# Patient Record
Sex: MALE | Race: WHITE | Employment: FULL TIME | URBAN - METROPOLITAN AREA
[De-identification: names, ages, dates, MRNs, and addresses within clinical notes are randomized per-mention and may not be internally consistent; named-entity substitution may affect disease eponyms.]

---

## 2017-02-07 ENCOUNTER — OFFICE VISIT (OUTPATIENT)
Dept: ORTHOPEDIC SURGERY | Age: 57
End: 2017-02-07

## 2017-02-07 VITALS — WEIGHT: 216.93 LBS | BODY MASS INDEX: 29.38 KG/M2 | HEIGHT: 72 IN

## 2017-02-07 DIAGNOSIS — S43.421S SPRAIN OF RIGHT ROTATOR CUFF CAPSULE, SEQUELA: ICD-10-CM

## 2017-02-07 DIAGNOSIS — M75.101 TEAR OF RIGHT ROTATOR CUFF, UNSPECIFIED TEAR EXTENT: Primary | ICD-10-CM

## 2017-02-07 PROCEDURE — 99213 OFFICE O/P EST LOW 20 MIN: CPT | Performed by: ORTHOPAEDIC SURGERY

## 2017-03-15 ENCOUNTER — TELEPHONE (OUTPATIENT)
Dept: ORTHOPEDIC SURGERY | Age: 57
End: 2017-03-15

## 2017-06-02 PROBLEM — K57.20 DIVERTICULITIS OF COLON WITH PERFORATION: Status: ACTIVE | Noted: 2017-06-02

## 2018-07-02 ENCOUNTER — OFFICE VISIT (OUTPATIENT)
Dept: ORTHOPEDIC SURGERY | Age: 58
End: 2018-07-02

## 2018-07-02 VITALS — HEIGHT: 72 IN | BODY MASS INDEX: 25.73 KG/M2 | WEIGHT: 190 LBS

## 2018-07-02 DIAGNOSIS — M25.562 LEFT KNEE PAIN, UNSPECIFIED CHRONICITY: Primary | ICD-10-CM

## 2018-07-02 DIAGNOSIS — M17.12 PRIMARY OSTEOARTHRITIS OF LEFT KNEE: ICD-10-CM

## 2018-07-02 PROCEDURE — 3017F COLORECTAL CA SCREEN DOC REV: CPT | Performed by: ORTHOPAEDIC SURGERY

## 2018-07-02 PROCEDURE — 99213 OFFICE O/P EST LOW 20 MIN: CPT | Performed by: ORTHOPAEDIC SURGERY

## 2018-07-02 PROCEDURE — L1830 KO IMMOB CANVAS LONG PRE OTS: HCPCS | Performed by: ORTHOPAEDIC SURGERY

## 2018-07-02 PROCEDURE — 4004F PT TOBACCO SCREEN RCVD TLK: CPT | Performed by: ORTHOPAEDIC SURGERY

## 2018-07-02 PROCEDURE — G8419 CALC BMI OUT NRM PARAM NOF/U: HCPCS | Performed by: ORTHOPAEDIC SURGERY

## 2018-07-02 PROCEDURE — G8427 DOCREV CUR MEDS BY ELIG CLIN: HCPCS | Performed by: ORTHOPAEDIC SURGERY

## 2018-07-02 RX ORDER — HYDROCODONE BITARTRATE AND ACETAMINOPHEN 5; 325 MG/1; MG/1
1 TABLET ORAL EVERY 6 HOURS PRN
Qty: 20 TABLET | Refills: 0 | Status: SHIPPED | OUTPATIENT
Start: 2018-07-02 | End: 2018-07-11 | Stop reason: SDUPTHER

## 2018-07-02 NOTE — PROGRESS NOTES
 Neck pain 2010    Other symptoms referable to back       Past Surgical History:     Past Surgical History:   Procedure Laterality Date    BACK SURGERY  2012    lumbar fusion    CHOLECYSTECTOMY      FOOT SURGERY  2010    KNEE ARTHROSCOPY Left 07/05/2016    also  I &D.  KNEE SURGERY  age 22   [de-identified] CUFF REPAIR Right 2014    SHOULDER SURGERY Right 2015     Current Medications:     Current Outpatient Prescriptions:     LORazepam (ATIVAN) 1 MG tablet, Take 1 mg by mouth 2 times daily. , Disp: , Rfl:     Atorvastatin Calcium (LIPITOR PO), Take 40 mg by mouth every morning , Disp: , Rfl:     Tamsulosin HCl (FLOMAX PO), Take 0.4 mg by mouth nightly , Disp: , Rfl:     Mirtazapine (REMERON PO), Take 30 mg by mouth nightly , Disp: , Rfl:   Allergies:  Patient has no known allergies. Social History:    reports that he has been smoking Cigarettes. He has a 10.00 pack-year smoking history. He has never used smokeless tobacco. He reports that he does not drink alcohol or use drugs. Family History:   Family History   Problem Relation Age of Onset    Other Mother         crohns    Heart Disease Father     High Blood Pressure Father        REVIEW OF SYSTEMS:   For new problems, a full review of systems will be found scanned in the patient's chart. CONSTITUTIONAL: Denies unexplained weight loss, fevers, chills   NEUROLOGICAL: Denies unsteady gait or progressive weakness  SKIN: Denies skin changes, delayed healing, rash, itching       PHYSICAL EXAM:    Vitals: Height 6' (1.829 m), weight 190 lb (86.2 kg). GENERAL EXAM:  · General Apparence: Patient is adequately groomed with no evidence of malnutrition. · Orientation: The patient is oriented to time, place and person. · Mood & Affect:The patient's mood and affect are appropriate       LEFT knee PHYSICAL EXAMINATION:  · Inspection:  Upon inspection the patient is a varus deformity of the LEFT knee with an effusion.   Well-healed surgical incisions complications that would include a stroke, cardiopulmonary pathology, and even death. We also discussed the rehabilitation involved with this operation and options that involved not only the hospitalization but then the potential of either going home with visiting home therapy versus going to a rehabilitation facility. We talked about the nuances of each of these treatments. The patient also realizes the need for the hinged knee brace in the rehabilitation process as well as the very significant role that the patient plays in terms of rehabilitation after this type of operation. All questions were answered. I have personally performed and/or participated in the history, exam and medical decision making and agree with all pertinent clinical information. I have also reviewed and agree with the past medical, family and social history unless otherwise noted. This dictation was performed with a verbal recognition program (DRAGON) and it was checked for errors. It is possible that there are still dictated errors within this office note. If so, please bring any errors to my attention for an addendum. All efforts were made to ensure that this office note is accurate.           Shamika Albright MD

## 2018-07-10 ENCOUNTER — TELEPHONE (OUTPATIENT)
Dept: ORTHOPEDIC SURGERY | Age: 58
End: 2018-07-10

## 2018-07-11 ENCOUNTER — HOSPITAL ENCOUNTER (OUTPATIENT)
Dept: OTHER | Age: 58
Discharge: HOME | End: 2018-07-11
Attending: ORTHOPAEDIC SURGERY | Admitting: ORTHOPAEDIC SURGERY

## 2018-07-11 ENCOUNTER — TELEPHONE (OUTPATIENT)
Dept: ORTHOPEDIC SURGERY | Age: 58
End: 2018-07-11

## 2018-07-11 DIAGNOSIS — M25.562 LEFT KNEE PAIN, UNSPECIFIED CHRONICITY: ICD-10-CM

## 2018-07-11 DIAGNOSIS — M17.12 PRIMARY OSTEOARTHRITIS OF LEFT KNEE: ICD-10-CM

## 2018-07-11 LAB
ABO/RH: NORMAL
ALBUMIN SERPL-MCNC: 3.9 G/DL (ref 3.4–5)
ANTIBODY SCREEN: NORMAL
APTT: 33.6 SEC (ref 26–36)
BASOPHILS ABSOLUTE: 0 K/UL (ref 0–0.2)
BASOPHILS RELATIVE PERCENT: 0.4 %
BILIRUBIN URINE: NEGATIVE
BLOOD, URINE: NEGATIVE
CLARITY: CLEAR
COLOR: YELLOW
EOSINOPHILS ABSOLUTE: 0.2 K/UL (ref 0–0.6)
EOSINOPHILS RELATIVE PERCENT: 1.8 %
GLUCOSE URINE: NEGATIVE MG/DL
HCT VFR BLD CALC: 46.3 % (ref 40.5–52.5)
HEMOGLOBIN: 16.2 G/DL (ref 13.5–17.5)
INR BLD: 0.95 (ref 0.86–1.14)
KETONES, URINE: NEGATIVE MG/DL
LEUKOCYTE ESTERASE, URINE: NEGATIVE
LYMPHOCYTES ABSOLUTE: 1.9 K/UL (ref 1–5.1)
LYMPHOCYTES RELATIVE PERCENT: 20 %
MCH RBC QN AUTO: 32.4 PG (ref 26–34)
MCHC RBC AUTO-ENTMCNC: 35 G/DL (ref 31–36)
MCV RBC AUTO: 92.4 FL (ref 80–100)
MICROSCOPIC EXAMINATION: NORMAL
MONOCYTES ABSOLUTE: 1.2 K/UL (ref 0–1.3)
MONOCYTES RELATIVE PERCENT: 12.5 %
NEUTROPHILS ABSOLUTE: 6.1 K/UL (ref 1.7–7.7)
NEUTROPHILS RELATIVE PERCENT: 65.3 %
NITRITE, URINE: NEGATIVE
PDW BLD-RTO: 13.6 % (ref 12.4–15.4)
PH UA: 7.5
PLATELET # BLD: 232 K/UL (ref 135–450)
PMV BLD AUTO: 8.1 FL (ref 5–10.5)
PROTEIN UA: NEGATIVE MG/DL
PROTHROMBIN TIME: 10.8 SEC (ref 9.8–13)
RBC # BLD: 5.01 M/UL (ref 4.2–5.9)
REASON FOR REJECTION: NORMAL
REJECTED TEST: NORMAL
SPECIFIC GRAVITY UA: 1.01
TRANSFERRIN: 222 MG/DL (ref 200–360)
URINE TYPE: NORMAL
UROBILINOGEN, URINE: 0.2 E.U./DL
WBC # BLD: 9.4 K/UL (ref 4–11)

## 2018-07-11 RX ORDER — HYDROCODONE BITARTRATE AND ACETAMINOPHEN 5; 325 MG/1; MG/1
1 TABLET ORAL EVERY 6 HOURS PRN
Qty: 20 TABLET | Refills: 0 | Status: SHIPPED | OUTPATIENT
Start: 2018-07-11 | End: 2018-07-16

## 2018-07-11 RX ORDER — OXYCODONE HYDROCHLORIDE AND ACETAMINOPHEN 5; 325 MG/1; MG/1
1 TABLET ORAL EVERY 6 HOURS PRN
Qty: 28 TABLET | Refills: 0 | Status: CANCELLED | OUTPATIENT
Start: 2018-07-11 | End: 2018-07-18

## 2018-07-11 NOTE — TELEPHONE ENCOUNTER
Last week this patient was given a very small quantity of pain medication to take before upcoming total joint replacement. I will give him 1 additional small quantity of pain medication but he cannot have any more refills until his postoperative prescription.   He will need to make this medication last.

## 2018-07-12 LAB
ESTIMATED AVERAGE GLUCOSE: 114 MG/DL
HBA1C MFR BLD: 5.6 %
URINE CULTURE, ROUTINE: NORMAL

## 2018-07-20 NOTE — PROGRESS NOTES
Obstructive Sleep Apnea (MARY) Screening     Patient:  Main Shirley    YOB: 1960      Medical Record #:  [id number]                     Date:  7/20/2018     1. Are you a loud and/or regular snorer? []  Yes       [x] No    2. Have you been observed to gasp or stop breathing during sleep? []  Yes       [x] No    3. Do you feel tired or groggy upon awakening or do you awaken with a headache?           []  Yes       [x] No    4. Are you often tired or fatigued during the wake time hours? []  Yes       [x] No    5. Do you fall asleep sitting, reading, watching TV or driving? []  Yes       [x] No    6. Do you often have problems with memory or concentration? []  Yes       [x] No    **If patient's score is ? 3 they are considered high risk for MARY. Notify the anesthesiologist of the high risk and document in focus note. Note:  If the patient's BMI is more than 35 kg m¯² , has neck circumference > 40 cm, and/or high blood pressure the risk is greater (© American Sleep Apnea Association, 2006).

## 2018-07-25 ENCOUNTER — ANESTHESIA EVENT (OUTPATIENT)
Dept: OPERATING ROOM | Age: 58
DRG: 470 | End: 2018-07-25
Payer: COMMERCIAL

## 2018-07-25 RX ORDER — PREGABALIN 75 MG/1
75 CAPSULE ORAL 2 TIMES DAILY
Status: CANCELLED | OUTPATIENT
Start: 2018-07-25

## 2018-07-25 RX ORDER — CELECOXIB 100 MG/1
100 CAPSULE ORAL 2 TIMES DAILY
Status: CANCELLED | OUTPATIENT
Start: 2018-07-25

## 2018-07-25 RX ORDER — CYCLOBENZAPRINE HCL 10 MG
10 TABLET ORAL 3 TIMES DAILY PRN
Status: CANCELLED | OUTPATIENT
Start: 2018-07-25

## 2018-07-26 ENCOUNTER — APPOINTMENT (OUTPATIENT)
Dept: GENERAL RADIOLOGY | Age: 58
DRG: 470 | End: 2018-07-26
Attending: ORTHOPAEDIC SURGERY
Payer: COMMERCIAL

## 2018-07-26 ENCOUNTER — HOSPITAL ENCOUNTER (INPATIENT)
Age: 58
LOS: 1 days | Discharge: HOME | DRG: 470 | End: 2018-07-27
Attending: ORTHOPAEDIC SURGERY | Admitting: ORTHOPAEDIC SURGERY
Payer: COMMERCIAL

## 2018-07-26 ENCOUNTER — ANESTHESIA (OUTPATIENT)
Dept: OPERATING ROOM | Age: 58
DRG: 470 | End: 2018-07-26
Payer: COMMERCIAL

## 2018-07-26 VITALS — TEMPERATURE: 98.4 F | SYSTOLIC BLOOD PRESSURE: 104 MMHG | OXYGEN SATURATION: 98 % | DIASTOLIC BLOOD PRESSURE: 64 MMHG

## 2018-07-26 DIAGNOSIS — Z96.652 STATUS POST TOTAL LEFT KNEE REPLACEMENT: ICD-10-CM

## 2018-07-26 DIAGNOSIS — M17.12 OSTEOARTHRITIS OF LEFT KNEE, UNSPECIFIED OSTEOARTHRITIS TYPE: Primary | ICD-10-CM

## 2018-07-26 PROBLEM — Z96.642 STATUS POST TOTAL HIP REPLACEMENT, LEFT: Status: ACTIVE | Noted: 2018-07-26

## 2018-07-26 LAB
ABO/RH: NORMAL
ANTIBODY SCREEN: NORMAL

## 2018-07-26 PROCEDURE — 2700000000 HC OXYGEN THERAPY PER DAY

## 2018-07-26 PROCEDURE — 0SRD069 REPLACEMENT OF LEFT KNEE JOINT WITH OXIDIZED ZIRCONIUM ON POLYETHYLENE SYNTHETIC SUBSTITUTE, CEMENTED, OPEN APPROACH: ICD-10-PCS | Performed by: ORTHOPAEDIC SURGERY

## 2018-07-26 PROCEDURE — 2500000003 HC RX 250 WO HCPCS: Performed by: NURSE ANESTHETIST, CERTIFIED REGISTERED

## 2018-07-26 PROCEDURE — 94664 DEMO&/EVAL PT USE INHALER: CPT

## 2018-07-26 PROCEDURE — 2500000003 HC RX 250 WO HCPCS: Performed by: ORTHOPAEDIC SURGERY

## 2018-07-26 PROCEDURE — 2720000001 HC MISC SURG SUPPLY STERILE $51-500: Performed by: ORTHOPAEDIC SURGERY

## 2018-07-26 PROCEDURE — 2709999900 HC NON-CHARGEABLE SUPPLY: Performed by: ORTHOPAEDIC SURGERY

## 2018-07-26 PROCEDURE — 2500000003 HC RX 250 WO HCPCS

## 2018-07-26 PROCEDURE — 6360000002 HC RX W HCPCS: Performed by: ANESTHESIOLOGY

## 2018-07-26 PROCEDURE — 3E0T3BZ INTRODUCTION OF ANESTHETIC AGENT INTO PERIPHERAL NERVES AND PLEXI, PERCUTANEOUS APPROACH: ICD-10-PCS | Performed by: ORTHOPAEDIC SURGERY

## 2018-07-26 PROCEDURE — 3600000008 HC SURGERY OHS BASE: Performed by: ORTHOPAEDIC SURGERY

## 2018-07-26 PROCEDURE — 1200000000 HC SEMI PRIVATE

## 2018-07-26 PROCEDURE — 94150 VITAL CAPACITY TEST: CPT

## 2018-07-26 PROCEDURE — 6370000000 HC RX 637 (ALT 250 FOR IP): Performed by: PHYSICIAN ASSISTANT

## 2018-07-26 PROCEDURE — 3700000000 HC ANESTHESIA ATTENDED CARE: Performed by: ORTHOPAEDIC SURGERY

## 2018-07-26 PROCEDURE — 6370000000 HC RX 637 (ALT 250 FOR IP): Performed by: ANESTHESIOLOGY

## 2018-07-26 PROCEDURE — 6360000002 HC RX W HCPCS: Performed by: PHYSICIAN ASSISTANT

## 2018-07-26 PROCEDURE — 6360000002 HC RX W HCPCS: Performed by: ORTHOPAEDIC SURGERY

## 2018-07-26 PROCEDURE — 2580000003 HC RX 258: Performed by: ORTHOPAEDIC SURGERY

## 2018-07-26 PROCEDURE — 2580000003 HC RX 258: Performed by: PHYSICIAN ASSISTANT

## 2018-07-26 PROCEDURE — 6360000002 HC RX W HCPCS: Performed by: NURSE ANESTHETIST, CERTIFIED REGISTERED

## 2018-07-26 PROCEDURE — 86850 RBC ANTIBODY SCREEN: CPT

## 2018-07-26 PROCEDURE — 2580000003 HC RX 258: Performed by: ANESTHESIOLOGY

## 2018-07-26 PROCEDURE — 3600000018 HC SURGERY OHS ADDTL 15MIN: Performed by: ORTHOPAEDIC SURGERY

## 2018-07-26 PROCEDURE — 7100000001 HC PACU RECOVERY - ADDTL 15 MIN: Performed by: ORTHOPAEDIC SURGERY

## 2018-07-26 PROCEDURE — 73560 X-RAY EXAM OF KNEE 1 OR 2: CPT

## 2018-07-26 PROCEDURE — 86901 BLOOD TYPING SEROLOGIC RH(D): CPT

## 2018-07-26 PROCEDURE — 2720000010 HC SURG SUPPLY STERILE: Performed by: ORTHOPAEDIC SURGERY

## 2018-07-26 PROCEDURE — 3700000001 HC ADD 15 MINUTES (ANESTHESIA): Performed by: ORTHOPAEDIC SURGERY

## 2018-07-26 PROCEDURE — 88311 DECALCIFY TISSUE: CPT

## 2018-07-26 PROCEDURE — 86900 BLOOD TYPING SEROLOGIC ABO: CPT

## 2018-07-26 PROCEDURE — C1776 JOINT DEVICE (IMPLANTABLE): HCPCS | Performed by: ORTHOPAEDIC SURGERY

## 2018-07-26 PROCEDURE — 94761 N-INVAS EAR/PLS OXIMETRY MLT: CPT

## 2018-07-26 PROCEDURE — 64999 UNLISTED PX NERVOUS SYSTEM: CPT | Performed by: ANESTHESIOLOGY

## 2018-07-26 PROCEDURE — 88305 TISSUE EXAM BY PATHOLOGIST: CPT

## 2018-07-26 PROCEDURE — C9290 INJ, BUPIVACAINE LIPOSOME: HCPCS | Performed by: ORTHOPAEDIC SURGERY

## 2018-07-26 PROCEDURE — C1713 ANCHOR/SCREW BN/BN,TIS/BN: HCPCS | Performed by: ORTHOPAEDIC SURGERY

## 2018-07-26 PROCEDURE — 7100000000 HC PACU RECOVERY - FIRST 15 MIN: Performed by: ORTHOPAEDIC SURGERY

## 2018-07-26 DEVICE — JOURNEY TIBIAL BASEPLATE NONPOROUS                                    LEFT SIZE 7
Type: IMPLANTABLE DEVICE | Site: KNEE | Status: FUNCTIONAL
Brand: JOURNEY

## 2018-07-26 DEVICE — JOURNEY II BCS FEMORAL OXINIUM                                    LEFT SIZE 7
Type: IMPLANTABLE DEVICE | Site: KNEE | Status: FUNCTIONAL
Brand: JOURNEY

## 2018-07-26 DEVICE — RALLY HV BONE CEMENT 40 GRAMS
Type: IMPLANTABLE DEVICE | Site: KNEE | Status: FUNCTIONAL
Brand: RALLY

## 2018-07-26 DEVICE — JOURNEY BCS PATELLA RESURFACING                                    ROUND 32 MM STANDARD
Type: IMPLANTABLE DEVICE | Site: KNEE | Status: FUNCTIONAL
Brand: JOURNEY

## 2018-07-26 DEVICE — JOURNEY II BCS XLPE ARTICULAR                                    INSERT SIZE 7-8 LEFT 11MM
Type: IMPLANTABLE DEVICE | Site: KNEE | Status: FUNCTIONAL
Brand: JOURNEY

## 2018-07-26 RX ORDER — PREGABALIN 75 MG/1
75 CAPSULE ORAL ONCE
Status: COMPLETED | OUTPATIENT
Start: 2018-07-26 | End: 2018-07-26

## 2018-07-26 RX ORDER — SODIUM CHLORIDE 9 MG/ML
INJECTION, SOLUTION INTRAVENOUS CONTINUOUS
Status: DISCONTINUED | OUTPATIENT
Start: 2018-07-26 | End: 2018-07-27 | Stop reason: HOSPADM

## 2018-07-26 RX ORDER — ATORVASTATIN CALCIUM 40 MG/1
40 TABLET, FILM COATED ORAL EVERY MORNING
Status: DISCONTINUED | OUTPATIENT
Start: 2018-07-27 | End: 2018-07-27 | Stop reason: HOSPADM

## 2018-07-26 RX ORDER — ONDANSETRON 2 MG/ML
4 INJECTION INTRAMUSCULAR; INTRAVENOUS
Status: DISCONTINUED | OUTPATIENT
Start: 2018-07-26 | End: 2018-07-26 | Stop reason: HOSPADM

## 2018-07-26 RX ORDER — MIDAZOLAM HYDROCHLORIDE 1 MG/ML
INJECTION INTRAMUSCULAR; INTRAVENOUS PRN
Status: DISCONTINUED | OUTPATIENT
Start: 2018-07-26 | End: 2018-07-26 | Stop reason: SDUPTHER

## 2018-07-26 RX ORDER — SODIUM CHLORIDE 0.9 % (FLUSH) 0.9 %
10 SYRINGE (ML) INJECTION PRN
Status: DISCONTINUED | OUTPATIENT
Start: 2018-07-26 | End: 2018-07-27 | Stop reason: HOSPADM

## 2018-07-26 RX ORDER — MEPERIDINE HYDROCHLORIDE 50 MG/ML
12.5 INJECTION INTRAMUSCULAR; INTRAVENOUS; SUBCUTANEOUS EVERY 5 MIN PRN
Status: DISCONTINUED | OUTPATIENT
Start: 2018-07-26 | End: 2018-07-26 | Stop reason: HOSPADM

## 2018-07-26 RX ORDER — ACETAMINOPHEN 325 MG/1
650 TABLET ORAL EVERY 4 HOURS PRN
Status: DISCONTINUED | OUTPATIENT
Start: 2018-07-26 | End: 2018-07-27 | Stop reason: HOSPADM

## 2018-07-26 RX ORDER — SODIUM CHLORIDE 0.9 % (FLUSH) 0.9 %
10 SYRINGE (ML) INJECTION EVERY 12 HOURS SCHEDULED
Status: DISCONTINUED | OUTPATIENT
Start: 2018-07-26 | End: 2018-07-27 | Stop reason: HOSPADM

## 2018-07-26 RX ORDER — 0.9 % SODIUM CHLORIDE 0.9 %
500 INTRAVENOUS SOLUTION INTRAVENOUS
Status: DISCONTINUED | OUTPATIENT
Start: 2018-07-26 | End: 2018-07-26 | Stop reason: HOSPADM

## 2018-07-26 RX ORDER — ACETAMINOPHEN 500 MG
1000 TABLET ORAL ONCE
Status: COMPLETED | OUTPATIENT
Start: 2018-07-26 | End: 2018-07-26

## 2018-07-26 RX ORDER — FENTANYL CITRATE 50 UG/ML
25 INJECTION, SOLUTION INTRAMUSCULAR; INTRAVENOUS EVERY 5 MIN PRN
Status: DISCONTINUED | OUTPATIENT
Start: 2018-07-26 | End: 2018-07-26 | Stop reason: HOSPADM

## 2018-07-26 RX ORDER — TRANEXAMIC ACID 100 MG/ML
INJECTION, SOLUTION INTRAVENOUS PRN
Status: DISCONTINUED | OUTPATIENT
Start: 2018-07-26 | End: 2018-07-26 | Stop reason: SDUPTHER

## 2018-07-26 RX ORDER — ONDANSETRON 2 MG/ML
INJECTION INTRAMUSCULAR; INTRAVENOUS PRN
Status: DISCONTINUED | OUTPATIENT
Start: 2018-07-26 | End: 2018-07-26 | Stop reason: SDUPTHER

## 2018-07-26 RX ORDER — MIRTAZAPINE 15 MG/1
30 TABLET, FILM COATED ORAL NIGHTLY
Status: DISCONTINUED | OUTPATIENT
Start: 2018-07-26 | End: 2018-07-27 | Stop reason: HOSPADM

## 2018-07-26 RX ORDER — OXYCODONE HYDROCHLORIDE AND ACETAMINOPHEN 5; 325 MG/1; MG/1
2 TABLET ORAL EVERY 4 HOURS PRN
Status: DISCONTINUED | OUTPATIENT
Start: 2018-07-26 | End: 2018-07-27 | Stop reason: HOSPADM

## 2018-07-26 RX ORDER — SODIUM CHLORIDE, SODIUM LACTATE, POTASSIUM CHLORIDE, CALCIUM CHLORIDE 600; 310; 30; 20 MG/100ML; MG/100ML; MG/100ML; MG/100ML
INJECTION, SOLUTION INTRAVENOUS CONTINUOUS
Status: DISCONTINUED | OUTPATIENT
Start: 2018-07-26 | End: 2018-07-26

## 2018-07-26 RX ORDER — CELECOXIB 100 MG/1
200 CAPSULE ORAL ONCE
Status: COMPLETED | OUTPATIENT
Start: 2018-07-26 | End: 2018-07-26

## 2018-07-26 RX ORDER — LIDOCAINE HYDROCHLORIDE 10 MG/ML
1 INJECTION, SOLUTION EPIDURAL; INFILTRATION; INTRACAUDAL; PERINEURAL
Status: DISCONTINUED | OUTPATIENT
Start: 2018-07-26 | End: 2018-07-26 | Stop reason: HOSPADM

## 2018-07-26 RX ORDER — DEXAMETHASONE SODIUM PHOSPHATE 4 MG/ML
INJECTION, SOLUTION INTRA-ARTICULAR; INTRALESIONAL; INTRAMUSCULAR; INTRAVENOUS; SOFT TISSUE PRN
Status: DISCONTINUED | OUTPATIENT
Start: 2018-07-26 | End: 2018-07-26 | Stop reason: SDUPTHER

## 2018-07-26 RX ORDER — ONDANSETRON 2 MG/ML
4 INJECTION INTRAMUSCULAR; INTRAVENOUS EVERY 6 HOURS PRN
Status: DISCONTINUED | OUTPATIENT
Start: 2018-07-26 | End: 2018-07-27 | Stop reason: HOSPADM

## 2018-07-26 RX ORDER — OXYCODONE HYDROCHLORIDE AND ACETAMINOPHEN 5; 325 MG/1; MG/1
1 TABLET ORAL EVERY 4 HOURS PRN
Status: DISCONTINUED | OUTPATIENT
Start: 2018-07-26 | End: 2018-07-27 | Stop reason: HOSPADM

## 2018-07-26 RX ORDER — HYDRALAZINE HYDROCHLORIDE 20 MG/ML
5 INJECTION INTRAMUSCULAR; INTRAVENOUS EVERY 10 MIN PRN
Status: DISCONTINUED | OUTPATIENT
Start: 2018-07-26 | End: 2018-07-26 | Stop reason: HOSPADM

## 2018-07-26 RX ORDER — TAMSULOSIN HYDROCHLORIDE 0.4 MG/1
0.4 CAPSULE ORAL NIGHTLY
Status: DISCONTINUED | OUTPATIENT
Start: 2018-07-26 | End: 2018-07-27 | Stop reason: HOSPADM

## 2018-07-26 RX ORDER — OXYCODONE HYDROCHLORIDE AND ACETAMINOPHEN 5; 325 MG/1; MG/1
2 TABLET ORAL PRN
Status: DISCONTINUED | OUTPATIENT
Start: 2018-07-26 | End: 2018-07-26 | Stop reason: HOSPADM

## 2018-07-26 RX ORDER — MORPHINE SULFATE 2 MG/ML
2 INJECTION, SOLUTION INTRAMUSCULAR; INTRAVENOUS
Status: DISCONTINUED | OUTPATIENT
Start: 2018-07-26 | End: 2018-07-27 | Stop reason: HOSPADM

## 2018-07-26 RX ORDER — DOCUSATE SODIUM 100 MG/1
100 CAPSULE, LIQUID FILLED ORAL 2 TIMES DAILY
Status: DISCONTINUED | OUTPATIENT
Start: 2018-07-26 | End: 2018-07-27 | Stop reason: HOSPADM

## 2018-07-26 RX ORDER — LIDOCAINE HYDROCHLORIDE 20 MG/ML
INJECTION, SOLUTION INFILTRATION; PERINEURAL PRN
Status: DISCONTINUED | OUTPATIENT
Start: 2018-07-26 | End: 2018-07-26 | Stop reason: SDUPTHER

## 2018-07-26 RX ORDER — MORPHINE SULFATE 4 MG/ML
4 INJECTION, SOLUTION INTRAMUSCULAR; INTRAVENOUS
Status: DISCONTINUED | OUTPATIENT
Start: 2018-07-26 | End: 2018-07-27 | Stop reason: HOSPADM

## 2018-07-26 RX ORDER — PROPOFOL 10 MG/ML
INJECTION, EMULSION INTRAVENOUS PRN
Status: DISCONTINUED | OUTPATIENT
Start: 2018-07-26 | End: 2018-07-26 | Stop reason: SDUPTHER

## 2018-07-26 RX ORDER — FENTANYL CITRATE 50 UG/ML
INJECTION, SOLUTION INTRAMUSCULAR; INTRAVENOUS PRN
Status: DISCONTINUED | OUTPATIENT
Start: 2018-07-26 | End: 2018-07-26 | Stop reason: SDUPTHER

## 2018-07-26 RX ORDER — LORAZEPAM 1 MG/1
1 TABLET ORAL 2 TIMES DAILY
Status: DISCONTINUED | OUTPATIENT
Start: 2018-07-26 | End: 2018-07-27 | Stop reason: HOSPADM

## 2018-07-26 RX ORDER — OXYCODONE HYDROCHLORIDE AND ACETAMINOPHEN 5; 325 MG/1; MG/1
1 TABLET ORAL PRN
Status: DISCONTINUED | OUTPATIENT
Start: 2018-07-26 | End: 2018-07-26 | Stop reason: HOSPADM

## 2018-07-26 RX ORDER — CYCLOBENZAPRINE HCL 10 MG
10 TABLET ORAL ONCE
Status: COMPLETED | OUTPATIENT
Start: 2018-07-26 | End: 2018-07-26

## 2018-07-26 RX ORDER — PROMETHAZINE HYDROCHLORIDE 25 MG/ML
6.25 INJECTION, SOLUTION INTRAMUSCULAR; INTRAVENOUS
Status: DISCONTINUED | OUTPATIENT
Start: 2018-07-26 | End: 2018-07-26 | Stop reason: HOSPADM

## 2018-07-26 RX ADMIN — CELECOXIB 200 MG: 100 CAPSULE ORAL at 10:16

## 2018-07-26 RX ADMIN — ACETAMINOPHEN 1000 MG: 500 TABLET, FILM COATED ORAL at 10:15

## 2018-07-26 RX ADMIN — TRANEXAMIC ACID 1000 MG: 100 INJECTION, SOLUTION INTRAVENOUS at 12:58

## 2018-07-26 RX ADMIN — Medication 0.5 MG: at 14:44

## 2018-07-26 RX ADMIN — Medication 0.5 MG: at 15:00

## 2018-07-26 RX ADMIN — Medication 2 G: at 20:48

## 2018-07-26 RX ADMIN — PHENYLEPHRINE HYDROCHLORIDE 200 MCG: 10 INJECTION INTRAVENOUS at 13:04

## 2018-07-26 RX ADMIN — ONDANSETRON 4 MG: 2 INJECTION INTRAMUSCULAR; INTRAVENOUS at 13:53

## 2018-07-26 RX ADMIN — SODIUM CHLORIDE, POTASSIUM CHLORIDE, SODIUM LACTATE AND CALCIUM CHLORIDE: 600; 310; 30; 20 INJECTION, SOLUTION INTRAVENOUS at 13:51

## 2018-07-26 RX ADMIN — Medication 10 ML: at 20:48

## 2018-07-26 RX ADMIN — SUGAMMADEX 100 MG: 100 INJECTION, SOLUTION INTRAVENOUS at 13:49

## 2018-07-26 RX ADMIN — LORAZEPAM 1 MG: 1 TABLET ORAL at 18:01

## 2018-07-26 RX ADMIN — DEXAMETHASONE SODIUM PHOSPHATE 10 MG: 4 INJECTION, SOLUTION INTRAMUSCULAR; INTRAVENOUS at 12:51

## 2018-07-26 RX ADMIN — Medication 2 G: at 12:51

## 2018-07-26 RX ADMIN — MIRTAZAPINE 30 MG: 15 TABLET, FILM COATED ORAL at 20:48

## 2018-07-26 RX ADMIN — TAMSULOSIN HYDROCHLORIDE 0.4 MG: 0.4 CAPSULE ORAL at 20:48

## 2018-07-26 RX ADMIN — DOCUSATE SODIUM 100 MG: 100 CAPSULE, LIQUID FILLED ORAL at 20:48

## 2018-07-26 RX ADMIN — PROPOFOL 150 MG: 10 INJECTION, EMULSION INTRAVENOUS at 12:51

## 2018-07-26 RX ADMIN — FENTANYL CITRATE 100 MCG: 50 INJECTION INTRAMUSCULAR; INTRAVENOUS at 12:51

## 2018-07-26 RX ADMIN — FENTANYL CITRATE 100 MCG: 50 INJECTION INTRAMUSCULAR; INTRAVENOUS at 12:34

## 2018-07-26 RX ADMIN — Medication 0.5 MG: at 15:30

## 2018-07-26 RX ADMIN — PHENYLEPHRINE HYDROCHLORIDE 200 MCG: 10 INJECTION INTRAVENOUS at 13:01

## 2018-07-26 RX ADMIN — LIDOCAINE HYDROCHLORIDE 100 MG: 20 INJECTION, SOLUTION INFILTRATION; PERINEURAL at 12:51

## 2018-07-26 RX ADMIN — ONDANSETRON 4 MG: 2 INJECTION INTRAMUSCULAR; INTRAVENOUS at 12:58

## 2018-07-26 RX ADMIN — Medication 0.5 MG: at 14:34

## 2018-07-26 RX ADMIN — SUGAMMADEX 100 MG: 100 INJECTION, SOLUTION INTRAVENOUS at 13:57

## 2018-07-26 RX ADMIN — MIDAZOLAM HYDROCHLORIDE 2 MG: 2 INJECTION, SOLUTION INTRAMUSCULAR; INTRAVENOUS at 12:51

## 2018-07-26 RX ADMIN — MIDAZOLAM HYDROCHLORIDE 2 MG: 2 INJECTION, SOLUTION INTRAMUSCULAR; INTRAVENOUS at 12:34

## 2018-07-26 RX ADMIN — SODIUM CHLORIDE, POTASSIUM CHLORIDE, SODIUM LACTATE AND CALCIUM CHLORIDE: 600; 310; 30; 20 INJECTION, SOLUTION INTRAVENOUS at 12:51

## 2018-07-26 RX ADMIN — SODIUM CHLORIDE, POTASSIUM CHLORIDE, SODIUM LACTATE AND CALCIUM CHLORIDE: 600; 310; 30; 20 INJECTION, SOLUTION INTRAVENOUS at 10:17

## 2018-07-26 RX ADMIN — PREGABALIN 75 MG: 75 CAPSULE ORAL at 10:16

## 2018-07-26 RX ADMIN — OXYCODONE HYDROCHLORIDE AND ACETAMINOPHEN 2 TABLET: 5; 325 TABLET ORAL at 18:01

## 2018-07-26 RX ADMIN — CYCLOBENZAPRINE HYDROCHLORIDE 10 MG: 10 TABLET, FILM COATED ORAL at 10:16

## 2018-07-26 ASSESSMENT — PULMONARY FUNCTION TESTS
PIF_VALUE: 3
PIF_VALUE: 15
PIF_VALUE: 16
PIF_VALUE: 2
PIF_VALUE: 17
PIF_VALUE: 16
PIF_VALUE: 15
PIF_VALUE: 15
PIF_VALUE: 16
PIF_VALUE: 2
PIF_VALUE: 16
PIF_VALUE: 3
PIF_VALUE: 17
PIF_VALUE: 3
PIF_VALUE: 3
PIF_VALUE: 2
PIF_VALUE: 16
PIF_VALUE: 3
PIF_VALUE: 2
PIF_VALUE: 16
PIF_VALUE: 2
PIF_VALUE: 2
PIF_VALUE: 3
PIF_VALUE: 16
PIF_VALUE: 15
PIF_VALUE: 15
PIF_VALUE: 16
PIF_VALUE: 1
PIF_VALUE: 16
PIF_VALUE: 17
PIF_VALUE: 0
PIF_VALUE: 17
PIF_VALUE: 2
PIF_VALUE: 2
PIF_VALUE: 3
PIF_VALUE: 15
PIF_VALUE: 16
PIF_VALUE: 15
PIF_VALUE: 16
PIF_VALUE: 15
PIF_VALUE: 15
PIF_VALUE: 2
PIF_VALUE: 14
PIF_VALUE: 17
PIF_VALUE: 16
PIF_VALUE: 0
PIF_VALUE: 1
PIF_VALUE: 17
PIF_VALUE: 16
PIF_VALUE: 16
PIF_VALUE: 2
PIF_VALUE: 16
PIF_VALUE: 16
PIF_VALUE: 17
PIF_VALUE: 14
PIF_VALUE: 16
PIF_VALUE: 3
PIF_VALUE: 16
PIF_VALUE: 2
PIF_VALUE: 4
PIF_VALUE: 16
PIF_VALUE: 2
PIF_VALUE: 14
PIF_VALUE: 16
PIF_VALUE: 17
PIF_VALUE: 15
PIF_VALUE: 3
PIF_VALUE: 16
PIF_VALUE: 10
PIF_VALUE: 16
PIF_VALUE: 16
PIF_VALUE: 2
PIF_VALUE: 17
PIF_VALUE: 16
PIF_VALUE: 17
PIF_VALUE: 14

## 2018-07-26 ASSESSMENT — PAIN SCALES - GENERAL
PAINLEVEL_OUTOF10: 10
PAINLEVEL_OUTOF10: 8
PAINLEVEL_OUTOF10: 9
PAINLEVEL_OUTOF10: 8
PAINLEVEL_OUTOF10: 9

## 2018-07-26 ASSESSMENT — LIFESTYLE VARIABLES: SMOKING_STATUS: 1

## 2018-07-26 ASSESSMENT — PAIN - FUNCTIONAL ASSESSMENT: PAIN_FUNCTIONAL_ASSESSMENT: 0-10

## 2018-07-26 ASSESSMENT — PAIN DESCRIPTION - DESCRIPTORS: DESCRIPTORS: CONSTANT

## 2018-07-26 NOTE — ANESTHESIA POSTPROCEDURE EVALUATION
Department of Anesthesiology  Postprocedure Note    Patient: Mabel Centeno  MRN: [id number]  YOB: 1960  Date of evaluation: 7/26/2018  Time:  11:41 AM     Procedure Summary     Date:  07/26/18 Room / Location:  [facility] OR [address] [place] [address] OR    Anesthesia Start:   Anesthesia Stop:      Procedure:  LEFT TOTAL KNEE REPLACEMENT LEONARD & NEPHEW Emi Desanctis OX XLPE AND PRP (Left ) Diagnosis:       Osteoarthritis of left knee, unspecified osteoarthritis type      (OSTEOARTHRITIS LEFT KNEE)    Surgeon:  Mariaelena Gomez MD Responsible Provider:      Anesthesia Type:  general, regional ASA Status:  2        Anesthesia Post Evaluation     Anesthetic Problems: no   Last Vitals:   BP: 135/81 Pulse: 79   Resp: 16 SpO2: 96   Temp: 97.7 °F (36.5 °C)        Cardiovascular System Stable: yes  Respiratory Function: Airway Patent yes  ETT no  Ventilator no  Level of consciousness: awake, alert and oriented  Post-op pain: adequate analgesia  Hydration Adequate: yes  Nausea/Vomiting:no  Other Issues:     Alejandro Turk MD

## 2018-07-26 NOTE — ANESTHESIA PRE PROCEDURE
Department of Anesthesiology  Preprocedure Note       Name:  Pasquale Simmons   Age:  61 y.o.  :  1960                                          MRN:  [id number]         Date:  2018      Surgeon: Marissa New):  Alexys hCun MD    Procedure: Procedure(s):  LEFT TOTAL KNEE REPLACEMENT LEONARD & NEPHALISSA HAHN OX XLPE AND PRP    HPI: The patient is a 61 y.o. male with severe LEFT knee pain. He was seen back in  when he had injections which really did not offer him much relief. He has known significant osteoarthritis of his LEFT knee. His significant knee pain is affecting his ability to perform activities of daily living and work duties. He works at a car [employer]. He uses a cane. Been using prescription anti-inflammatories. He has done home exercises    Medications prior to admission:   Prior to Admission medications    Medication Sig Start Date End Date Taking? Authorizing Provider   LORazepam (ATIVAN) 1 MG tablet Take 1 mg by mouth 2 times daily.    Yes Historical Provider, MD   Atorvastatin Calcium (LIPITOR PO) Take 40 mg by mouth every morning    Yes Historical Provider, MD   Tamsulosin HCl (FLOMAX PO) Take 0.4 mg by mouth nightly    Yes Historical Provider, MD   Mirtazapine (REMERON PO) Take 30 mg by mouth nightly    Yes Historical Provider, MD       Current medications:    Current Facility-Administered Medications   Medication Dose Route Frequency Provider Last Rate Last Dose    ceFAZolin (ANCEF) 2 g in sterile water 20 mL IV syringe  2 g Intravenous On Call to Triny Singh MD        celecoxib (CELEBREX) capsule 200 mg  200 mg Oral Once Mansoor Jiménez MD        bupivacaine liposome (EXPAREL) 1.3 % injection 266 mg  20 mL Subcutaneous Once Alexys Chun MD        lactated ringers infusion   Intravenous Continuous Arabella Cisse  mL/hr at 18 1017      lidocaine PF 1 % injection 1 mL  1 mL Intradermal Once PRN Arabella Cisse MD           Allergies:  No Known (36.6 °C) Height: 6' (1.829 m)  (07/26/18)  Weight: 194 lb (88 kg)  (07/26/18) BMI: 26.4    NPO Status: Time of last liquid consumption: 2230                        Time of last solid consumption: 2230                        Date of last liquid consumption: 07/25/18                        Date of last solid food consumption: 07/25/18    CBC:   Lab Results   Component Value Date    WBC 9.4 07/11/2018    RBC 5.01 07/11/2018    HGB 16.2 07/11/2018    HCT 46.3 07/11/2018    MCV 92.4 07/11/2018    RDW 13.6 07/11/2018     07/11/2018     CMP:   Lab Results   Component Value Date     06/03/2017    K 3.7 06/03/2017     06/03/2017    CO2 25 06/03/2017    BUN 9 06/03/2017    CREATININE 0.7 06/03/2017    GFRAA >60 06/03/2017    AGRATIO 1.2 06/03/2017    LABGLOM >60 06/03/2017    GLUCOSE 102 06/03/2017    PROT 5.8 06/03/2017    CALCIUM 8.4 06/03/2017    BILITOT 0.9 06/03/2017    ALKPHOS 74 06/03/2017    AST 15 06/03/2017    ALT 34 06/03/2017     Coags:   Lab Results   Component Value Date    PROTIME 10.8 07/11/2018    INR 0.95 07/11/2018    APTT 33.6 07/11/2018       EKG: Normal sinus rhythm 73; Normal ECG; No previous ECGs available     Anesthesia Evaluation  Patient summary reviewed and Nursing notes reviewed  Airway: Mallampati: II  TM distance: >3 FB     Mouth opening: > = 3 FB Dental:    (+) upper dentures and lower dentures  Comment: Full upper, partial lower    Pulmonary:   (+) current smoker    (-) COPD, asthma, recent URI and sleep apnea                           Cardiovascular:  Exercise tolerance: good (>4 METS),   (+) hyperlipidemia    (-) hypertension, past MI, CABG/stent,  angina and  SENIOR                Neuro/Psych:      (-) seizures, TIA and CVA           GI/Hepatic/Renal:        (-) hiatal hernia and no renal disease      ROS comment: +Diverticulosis.    Endo/Other:    (+) : arthritis: OA., .    (-) diabetes mellitus, hypothyroidism, hyperthyroidism               Abdominal:           Vascular: - DVT and PE. Anesthesia Plan      general and regional     ASA 2     (Left Adductor Canal and IPACK blocks for post-op pain management per surgeon request.)  Induction: intravenous. MIPS: Prophylactic antiemetics administered. Anesthetic plan and risks discussed with patient. Plan discussed with CRNA.             Parveen Castelan MD   7/26/2018

## 2018-07-26 NOTE — PLAN OF CARE
Problem: Falls - Risk of:  Goal: Will remain free from falls  Will remain free from falls   Outcome: Ongoing  Bed in lowest position, wheels locked, bed alarm on, call light within reach. Patient verbalized understanding of using call light to obtain assistance   Goal: Absence of physical injury  Absence of physical injury   Outcome: Ongoing      Problem: Discharge Planning:  Goal: Discharged to appropriate level of care  Discharged to appropriate level of care  Outcome: Ongoing      Problem: Mobility - Impaired:  Goal: Mobility will improve  Mobility will improve  Outcome: Ongoing      Problem: Infection - Surgical Site:  Goal: Will show no infection signs and symptoms  Will show no infection signs and symptoms  Outcome: Ongoing      Problem: Pain - Acute:  Goal: Pain level will decrease  Pain level will decrease  Outcome: Ongoing  Patient able to verbalize pain, instructed to notify rn of increased pain.   Reviewed medications prescribed and frequency     Comments: Plan of care discussed with patient, verbalized understanding

## 2018-07-26 NOTE — OP NOTE
[facility], [facility]                                 OPERATIVE REPORT    PATIENT NAME: Seth Baxter                       :        1960  MED REC NO:   [id number]                          ROOM:       8901  ACCOUNT NO:   [de-identified]                           ADMIT DATE: 2018  PROVIDER:     Karon Coe MD    DATE OF PROCEDURE:  2018    SERVICE:  Orthopedic Surgery. SURGEON:  Юлия Adams MD    FIRST ASSISTANT:  TYREL Rascon    PREOPERATIVE DIAGNOSIS:  Severe osteoarthritis of the left knee, 715. 36. POSTOPERATIVE DIAGNOSIS:  Severe osteoarthritis of the left knee, 715.36. OPERATIVE PROCEDURE:  Total joint replacement arthroplasty, [id number], left  Smith and Nephew JOURNEY II Oxinium total knee replacement using size 7  Oxinium femoral component, size 7 tibial component, an 11-mm deep flexion  XLPE polyethylene insert, and 32-mm onlay patella. TOURNIQUET TIME:  300 mmHg for 46 minutes. DRAINS:  One Bard. COMPLICATIONS:  None. X-RAYS:  None. ANTIBIOTICS:  As per SCIP protocol, based upon patient age, weight, and  allergies. INSTRUMENT COUNT:  Correct x2. ESTIMATED BLOOD LOSS:  Less than 100 mL. DISPOSITION:  To the recovery room. X-rays in the recovery room ordered of  the operative knee. INDICATIONS FOR SURGERY:  She is 61years old. The above-mentioned patient  presents for elective total knee replacement arthroplasty on the date of  surgery. The history is that the patient has well-outlined records from  [facility]. The patient has failed nonoperative measures with  respect to control of patient's pain and function. There has been an  extensive discussion regarding the risks, benefits, and potential  complications of this procedure, as well as normal rehabilitative protocol.   The patient specifically voiced understanding to concerns with respect

## 2018-07-26 NOTE — BRIEF OP NOTE
Brief Postoperative Note  ______________________________________________________________    Patient: Imelda Burleson  YOB: 1960  MRN: [id number]  Date of Procedure: 7/26/2018    Pre-Op Diagnosis: OSTEOARTHRITIS LEFT KNEE    Post-Op Diagnosis: Same       Procedure(s):  LEFT TOTAL KNEE REPLACEMENT LEONARD & NEPHALISSA Karime Falcont OX XLPE AND PRP    Anesthesia: Regional, General    Surgeon(s):  Mena Vázquez MD    Staff:  Surgical Assistant: Sandrita Kwok  Scrub Person First: April A Herber  Physician Assistant: TYREL Hensley     Estimated Blood Loss: * No values recorded between 7/26/2018 12:40 PM and 7/26/2018  0:73 PM * mL    Complications: None    Specimens:   ID Type Source Tests Collected by Time Destination   A : LEFT KNEE BONE  Bone Bone SURGICAL PATHOLOGY Mena Vázquez MD 7/26/2018 1325        Implants:    Implant Name Type Inv.  Item Serial No.  Lot No. LRB No. Used   CEMENT BONE RALLY HV Cement CEMENT BONE RALLY HV  [place]  [id number] Left 2   IMPL KNEE COMP FEM BI-CRU JOUR II OXI SZ 7 LT Knee IMPL KNEE COMP FEM BI-CRU JOUR II OXI SZ 7 [facility]  [id number] Left 1   IMPL KNEE TIB BASEPLT JOURNEY NP LT SZ 7 Knee IMPL KNEE TIB BASEPLT JOURNEY NP LT  7  SMITH  [id number] Left 1   IMPL KNEE TIB ART JRNY II XPLE 7-8 11MM LT Knee IMPL KNEE TIB ART JRNY II XPLE 7-8 11MM LT  LEONARD  [id number] Left 1   IMPL KNEE PATELLA COMP RESURF JOURNEY STD 32MM Knee IMPL KNEE PATELLA COMP RESURF JOURNEY STD 32MM   SMITH  [id number] Left 1         Drains:   Closed/Suction Drain Left;Lateral Knee Bulb 10 [place] (Active)       Findings: left TKA    TYREL Hensley  Date: 7/26/2018  Time: 2:22 PM

## 2018-07-27 VITALS
HEIGHT: 72 IN | BODY MASS INDEX: 26.28 KG/M2 | HEART RATE: 90 BPM | SYSTOLIC BLOOD PRESSURE: 112 MMHG | DIASTOLIC BLOOD PRESSURE: 76 MMHG | WEIGHT: 194 LBS | RESPIRATION RATE: 16 BRPM | TEMPERATURE: 98 F | OXYGEN SATURATION: 98 %

## 2018-07-27 LAB
ANION GAP SERPL CALCULATED.3IONS-SCNC: 9 MMOL/L (ref 3–16)
BUN BLDV-MCNC: 13 MG/DL (ref 7–20)
CALCIUM SERPL-MCNC: 8.2 MG/DL (ref 8.3–10.6)
CHLORIDE BLD-SCNC: 108 MMOL/L (ref 99–110)
CO2: 23 MMOL/L (ref 21–32)
CREAT SERPL-MCNC: 0.7 MG/DL (ref 0.9–1.3)
GFR AFRICAN AMERICAN: >60
GFR NON-AFRICAN AMERICAN: >60
GLUCOSE BLD-MCNC: 161 MG/DL (ref 70–99)
HCT VFR BLD CALC: 43.3 % (ref 40.5–52.5)
HEMOGLOBIN: 14.9 G/DL (ref 13.5–17.5)
MCH RBC QN AUTO: 31.3 PG (ref 26–34)
MCHC RBC AUTO-ENTMCNC: 34.5 G/DL (ref 31–36)
MCV RBC AUTO: 90.8 FL (ref 80–100)
PDW BLD-RTO: 13.5 % (ref 12.4–15.4)
PLATELET # BLD: 218 K/UL (ref 135–450)
PMV BLD AUTO: 7.6 FL (ref 5–10.5)
POTASSIUM REFLEX MAGNESIUM: 3.7 MMOL/L (ref 3.5–5.1)
RBC # BLD: 4.77 M/UL (ref 4.2–5.9)
SODIUM BLD-SCNC: 140 MMOL/L (ref 136–145)
WBC # BLD: 19.9 K/UL (ref 4–11)

## 2018-07-27 PROCEDURE — 80048 BASIC METABOLIC PNL TOTAL CA: CPT

## 2018-07-27 PROCEDURE — G8978 MOBILITY CURRENT STATUS: HCPCS

## 2018-07-27 PROCEDURE — G8979 MOBILITY GOAL STATUS: HCPCS

## 2018-07-27 PROCEDURE — 97110 THERAPEUTIC EXERCISES: CPT

## 2018-07-27 PROCEDURE — 97165 OT EVAL LOW COMPLEX 30 MIN: CPT

## 2018-07-27 PROCEDURE — 97116 GAIT TRAINING THERAPY: CPT

## 2018-07-27 PROCEDURE — 85027 COMPLETE CBC AUTOMATED: CPT

## 2018-07-27 PROCEDURE — 36415 COLL VENOUS BLD VENIPUNCTURE: CPT

## 2018-07-27 PROCEDURE — 97535 SELF CARE MNGMENT TRAINING: CPT

## 2018-07-27 PROCEDURE — G8988 SELF CARE GOAL STATUS: HCPCS

## 2018-07-27 PROCEDURE — 2580000003 HC RX 258: Performed by: PHYSICIAN ASSISTANT

## 2018-07-27 PROCEDURE — 6370000000 HC RX 637 (ALT 250 FOR IP): Performed by: PHYSICIAN ASSISTANT

## 2018-07-27 PROCEDURE — G8987 SELF CARE CURRENT STATUS: HCPCS

## 2018-07-27 PROCEDURE — 97530 THERAPEUTIC ACTIVITIES: CPT

## 2018-07-27 PROCEDURE — 6360000002 HC RX W HCPCS: Performed by: PHYSICIAN ASSISTANT

## 2018-07-27 PROCEDURE — 97161 PT EVAL LOW COMPLEX 20 MIN: CPT

## 2018-07-27 RX ORDER — OXYCODONE HYDROCHLORIDE AND ACETAMINOPHEN 5; 325 MG/1; MG/1
1-2 TABLET ORAL EVERY 4 HOURS PRN
Qty: 40 TABLET | Refills: 0 | Status: SHIPPED | OUTPATIENT
Start: 2018-07-27 | End: 2018-08-03 | Stop reason: SDUPTHER

## 2018-07-27 RX ADMIN — LORAZEPAM 1 MG: 1 TABLET ORAL at 09:29

## 2018-07-27 RX ADMIN — ATORVASTATIN CALCIUM 40 MG: 40 TABLET, FILM COATED ORAL at 09:29

## 2018-07-27 RX ADMIN — OXYCODONE HYDROCHLORIDE AND ACETAMINOPHEN 2 TABLET: 5; 325 TABLET ORAL at 04:49

## 2018-07-27 RX ADMIN — OXYCODONE HYDROCHLORIDE AND ACETAMINOPHEN 2 TABLET: 5; 325 TABLET ORAL at 12:55

## 2018-07-27 RX ADMIN — Medication 2 G: at 04:49

## 2018-07-27 RX ADMIN — APIXABAN 2.5 MG: 2.5 TABLET, FILM COATED ORAL at 09:29

## 2018-07-27 RX ADMIN — Medication 10 ML: at 09:29

## 2018-07-27 RX ADMIN — DOCUSATE SODIUM 100 MG: 100 CAPSULE, LIQUID FILLED ORAL at 09:29

## 2018-07-27 RX ADMIN — Medication 10 ML: at 04:49

## 2018-07-27 RX ADMIN — OXYCODONE HYDROCHLORIDE AND ACETAMINOPHEN 2 TABLET: 5; 325 TABLET ORAL at 08:55

## 2018-07-27 ASSESSMENT — PAIN DESCRIPTION - PAIN TYPE: TYPE: SURGICAL PAIN

## 2018-07-27 ASSESSMENT — PAIN DESCRIPTION - FREQUENCY: FREQUENCY: CONTINUOUS

## 2018-07-27 ASSESSMENT — PAIN DESCRIPTION - ORIENTATION
ORIENTATION: LEFT
ORIENTATION: LEFT

## 2018-07-27 ASSESSMENT — PAIN DESCRIPTION - LOCATION
LOCATION: KNEE
LOCATION: KNEE

## 2018-07-27 ASSESSMENT — PAIN DESCRIPTION - PROGRESSION: CLINICAL_PROGRESSION: NOT CHANGED

## 2018-07-27 ASSESSMENT — PAIN SCALES - GENERAL
PAINLEVEL_OUTOF10: 8

## 2018-07-27 ASSESSMENT — PAIN DESCRIPTION - DESCRIPTORS: DESCRIPTORS: DISCOMFORT

## 2018-07-27 ASSESSMENT — PAIN DESCRIPTION - ONSET: ONSET: ON-GOING

## 2018-07-27 NOTE — PROGRESS NOTES
Physical Therapy    Facility/Department: [place] [address] - MED SURG/ORTHO  Initial Assessment    NAME: Mabel Centeno  : 1960  MRN: [id number]    Date of Service: 2018    Discharge Recommendations:  Home with assist PRN, Outpatient PT   PT Equipment Recommendations  Equipment Needed: Yes  Mobility Devices: Huong Cheryl: Standard    Patient Diagnosis(es): The encounter diagnosis was Osteoarthritis of left knee, unspecified osteoarthritis type. has a past medical history of Anxiety; Back pain, chronic; BPH (benign prostatic hypertrophy); Chronic pain syndrome; Degeneration of lumbar or lumbosacral intervertebral disc; Diverticulitis; Hyperlipidemia; Insomnia; Knee pain; Neck pain; and Other symptoms referable to back. has a past surgical history that includes Foot surgery (Bilateral, ); back surgery (); Cholecystectomy; Rotator cuff repair (Right, ); shoulder surgery (Right, ); knee surgery (age 22); Knee arthroscopy (Left, 2016); and pr total knee arthroplasty (Left, 2018). Restrictions  Restrictions/Precautions: Weight Bearing, Surgical Protocols, General Precautions, Fall Risk  Left Lower Extremity Weight Bearing: Weight Bearing As Tolerated  Left Lower Extremity Brace: Knee Brace (Until pt able to SLR indep 10 x with control)  Other position/activity restrictions: up with assist  Vision/Hearing  Vision: Impaired  Vision Exceptions: Wears glasses for reading  Hearing: Within functional limits     Subjective  Chart Reviewed: Yes  Patient assessed for rehabilitation services?: Yes  Referring Practitioner: DUSTIN Alberts  Referral Date : 18  Diagnosis: L TKR  Follows Commands: Within Functional Limits  Comments: RN cleared pt for therapy, pt supine in bed on approach  Subjective: Agrees to therapy, hopes to go home today  Pain Screening  Patient Currently in Pain: Yes  Pain Assessment  Pain Assessment: 0-10  Pain Level: 8  Pain Type: Surgical pain  Pain Location: Knee  Pain be modified indep bed mob- met  Short term goal 2: pt to be modified indep with transfers  Short term goal 3: pt to amb 150 ft modified indep with SW  Short term goal 4: pt to negotiate stairs with rail supervised- met  Short term goal 5: pt to participate in 10-12 reps LE Ex per protocol  Patient Goals   Patient goals : \"to go home today\"       Therapy Time   Individual Concurrent Group Co-treatment   Time In 21          Time Out 0845         Minutes 33         Timed Code Treatment Minutes: [address] [facility], [id number]

## 2018-07-27 NOTE — PROGRESS NOTES
Occupational Therapy   Occupational Therapy Initial Assessment and Treatment Note  Date: 2018   Patient Name: Jacqueline Beth  MRN: [id number]     : 1960    Date of Service: 2018    Discharge Recommendations:  Home with assist PRN     Patient Diagnosis(es): The encounter diagnosis was Osteoarthritis of left knee, unspecified osteoarthritis type. has a past medical history of Anxiety; Back pain, chronic; BPH (benign prostatic hypertrophy); Chronic pain syndrome; Degeneration of lumbar or lumbosacral intervertebral disc; Diverticulitis; Hyperlipidemia; Insomnia; Knee pain; Neck pain; and Other symptoms referable to back. has a past surgical history that includes Foot surgery (Bilateral, ); back surgery (); Cholecystectomy; Rotator cuff repair (Right, ); shoulder surgery (Right, ); knee surgery (age 22); Knee arthroscopy (Left, 2016); and pr total knee arthroplasty (Left, 2018).     Restrictions  Restrictions/Precautions  Restrictions/Precautions: Weight Bearing, Surgical Protocols, General Precautions, Fall Risk  Required Braces or Orthoses?: Yes  Lower Extremity Weight Bearing Restrictions  Left Lower Extremity Weight Bearing: Weight Bearing As Tolerated  Required Braces or Orthoses  Left Lower Extremity Brace: Knee Brace (Until pt able to SLR indep 10 x with control)  Position Activity Restriction  Other position/activity restrictions: up with assist    Subjective   General  Chart Reviewed: Yes  Patient assessed for rehabilitation services?: Yes  Family / Caregiver Present: No  Referring Practitioner: GIACOMO Alberts  Diagnosis: L knee OA s/p L TKR 18  General Comment  Comments: RN cleared pt for activity   Pain Assessment  Patient Currently in Pain: Denies  Pain Assessment: 0-10  Pain Level: 8  Pain Type: Chronic pain  Pain Location: Knee  Pain Orientation: Left  Pain Descriptors: Constant  Pain Intervention(s): Cold applied, Ambulation/Increased activity, WFL     Assessment   Performance deficits / Impairments: Decreased functional mobility ; Decreased ADL status; Decreased high-level IADLs  After evaluation, pt found to be presenting with the above mentioned occupational performance deficits which are affecting participation in daily living skills. Pt would benefit from continued skilled occupational therapy to address ADLs, functional mobility, and safety while in acute care. Prognosis: Good  Decision Making: Low Complexity  Patient Education: role of OT, transfers, ADLs  REQUIRES OT FOLLOW UP: Yes  Activity Tolerance  Activity Tolerance: Patient Tolerated treatment well  Safety Devices  Safety Devices in place: Yes  Type of devices: Nurse notified;Gait belt;Call light within reach; Chair alarm in place; Left in chair         Plan   Plan  Times per week: 4-6x/week   Current Treatment Recommendations: Self-Care / ADL, Functional Mobility Training, Patient/Caregiver Education & Training, Equipment Evaluation, Education, & procurement, Safety Education & Training  G-Code  OT G-codes  Functional Assessment Tool Used: AM-PAC  Score: 19  Functional Limitation: Self care  Self Care Current Status (): At least 40 percent but less than 60 percent impaired, limited or restricted  Self Care Goal Status ():  At least 1 percent but less than 20 percent impaired, limited or restricted  AM-PAC Score   AM-[facility] Inpatient Daily Activity Raw Score: 19  AM-PAC Inpatient ADL T-Scale Score : 40.22  ADL Inpatient CMS 0-100% Score: 42.8  ADL Inpatient CMS G-Code Modifier : CK    Goals  Short term goals  Time Frame for Short term goals: for 1 week  Short term goal 1: Perform LE dressing with SBA by 8/3  Short term goal 2: Perform toilet/chair transfers with Mod I with SW  Short term goal 3: Perform 2 grooming tasks while standing at sink Mod I  Short term goal 4: Verbalize understanding of safe car transfer technique after instruction   Patient Goals   Patient goals : Jaun Moon

## 2018-07-27 NOTE — CARE COORDINATION
DC order noted. Order received for: d/c planning. Per TYREL Parker note: \"D/c planning for home, pt wishes for outpatient therapy will order and pt will need to arrange. SW to be delivered. DCP following. \" SW ordered per [address]. Denies further needs from DCP.

## 2018-07-27 NOTE — DISCHARGE SUMMARY
Department of Orthopedic Surgery  Physician Assistant   Discharge Summary    The Sarita Pierre is a 61 y.o. male underwent total knee replacement procedure without complication. Sarita Pierre was admitted to the floor following His recovery in the PACU. Discharge Diagnosis  left Knee Replacement    Current Inpatient Medications    Current Facility-Administered Medications: bupivacaine liposome (EXPAREL) 1.3 % injection 266 mg, 20 mL, Subcutaneous, Once  atorvastatin (LIPITOR) tablet 40 mg, 40 mg, Oral, QAM  LORazepam (ATIVAN) tablet 1 mg, 1 mg, Oral, BID  mirtazapine (REMERON) tablet 30 mg, 30 mg, Oral, Nightly  tamsulosin (FLOMAX) capsule 0.4 mg, 0.4 mg, Oral, Nightly  0.9 % sodium chloride infusion, , Intravenous, Continuous  sodium chloride flush 0.9 % injection 10 mL, 10 mL, Intravenous, 2 times per day  sodium chloride flush 0.9 % injection 10 mL, 10 mL, Intravenous, PRN  acetaminophen (TYLENOL) tablet 650 mg, 650 mg, Oral, Q4H PRN  oxyCODONE-acetaminophen (PERCOCET) 5-325 MG per tablet 1 tablet, 1 tablet, Oral, Q4H PRN **OR** oxyCODONE-acetaminophen (PERCOCET) 5-325 MG per tablet 2 tablet, 2 tablet, Oral, Q4H PRN  morphine injection 2 mg, 2 mg, Intravenous, Q2H PRN **OR** morphine (PF) injection 4 mg, 4 mg, Intravenous, Q2H PRN  docusate sodium (COLACE) capsule 100 mg, 100 mg, Oral, BID  ondansetron (ZOFRAN) injection 4 mg, 4 mg, Intravenous, Q6H PRN  apixaban (ELIQUIS) tablet 2.5 mg, 2.5 mg, Oral, BID    Post-operatively the patients diet was advanced as tolerated and their incision was checked on POD #1. The incision is dressing in place, clean, dry and intact with no signs of infection. The patient remained neurovascularly intact in the lower extremity and had intact pulses distally. Patients calf remained soft and showed no evidence of DVT. The patient was able to move their leg and ankle/foot without any problems post-operatively.   Physical therapy and occupational therapy were consulted and began working with the patient post-operatively. The patient progressed with PT/OT as would be expected and continued to improve through their stay. The patients pain was initially controlled with IV medications but we were able to transition to oral pain medications soon after arrival to the floor and their pain remained under good control through their hospital stay. From a medical standpoint the patient remained stable and continued to have the medicine team follow throughout their stay. The patients dressing was changed/incison was checked on day of d/c. The patient will be discharged at this time to Home  with their current diet restrictions and will continue to follow the total knee precautions outlined to them by us and PT/OT. Condition on Discharge: Stable    Plan  Return visit in 2 weeks. .  Patient was instructed on the use of pain medications, the signs and symptoms of infection, and was given our number to call should they have any questions or concerns following discharge.

## 2018-07-27 NOTE — PROGRESS NOTES
Patient given discharge instructions. Voiced on questions or concerns. Prescriptions filled by meds to beds.  Walker provider and wheeled out to [employer] car

## 2018-07-27 NOTE — PROGRESS NOTES
Chart reviewed and spoke with nursing staff this evening. No acute medical issues. Pt resting comfortably.   Will follow peripherally and reevaluate IM needs in AM.

## 2018-07-27 NOTE — PROGRESS NOTES
Delivered walker to [facility] room.   Thanks for the referral.  Maximo Lau  7/27/2018

## 2018-07-30 ENCOUNTER — HOSPITAL ENCOUNTER (OUTPATIENT)
Dept: PHYSICAL THERAPY | Age: 58
Setting detail: THERAPIES SERIES
Discharge: HOME | End: 2018-07-30
Payer: COMMERCIAL

## 2018-07-30 PROCEDURE — 97161 PT EVAL LOW COMPLEX 20 MIN: CPT | Performed by: PHYSICAL THERAPIST

## 2018-07-30 PROCEDURE — G8979 MOBILITY GOAL STATUS: HCPCS | Performed by: PHYSICAL THERAPIST

## 2018-07-30 PROCEDURE — 97140 MANUAL THERAPY 1/> REGIONS: CPT | Performed by: PHYSICAL THERAPIST

## 2018-07-30 PROCEDURE — 97110 THERAPEUTIC EXERCISES: CPT | Performed by: PHYSICAL THERAPIST

## 2018-07-30 PROCEDURE — 97016 VASOPNEUMATIC DEVICE THERAPY: CPT | Performed by: PHYSICAL THERAPIST

## 2018-07-30 PROCEDURE — G8978 MOBILITY CURRENT STATUS: HCPCS | Performed by: PHYSICAL THERAPIST

## 2018-07-30 NOTE — PLAN OF CARE
[facility], [facility]  [address] [place] [address], [address] [place] [address]  Phone: [phone]  Fax [phone]     Physical Therapy Certification    Dear Referring Practitioner: Dr. Edd Francis,    We had the pleasure of evaluating the following patient for physical therapy services at [address] [place] [address]. A summary of our findings can be found in the initial assessment below. This includes our plan of care. If you have any questions or concerns regarding these findings, please do not hesitate to contact me at the office phone number checked above. Thank you for the referral.       Physician Signature:_______________________________Date:__________________  By signing above (or electronic signature), therapists plan is approved by physician    Patient: Mabel Centeno   : 1960   MRN: [id number]  Referring Physician: Referring Practitioner: Dr. Edd Francis      Evaluation Date: 2018      Medical Diagnosis Information:  Diagnosis: Status post total left knee replacement (E56.013) DOS: 18   Treatment Diagnosis: left knee stiffness (M25.662); left knee pain (M25.562)                                          Insurance information: PT Insurance Information: Med [place]     Precautions/ Contra-indications: OA, bowel/bladder issues, 2 shoulder surgeries, B foot surgery, back surgery, gall bladder removal  Latex Allergy:  [x]NO      []YES  Preferred Language for Healthcare:   [x]English       []other:    SUBJECTIVE: Patient stated complaint:Patient is s/p L TKR 18. Hx of knee pain. Prior to surgery, L knee ROM 5-100 deg. 2016 trialed injections, NSAIDs, HEP and scope. Patient denied surgical complications. Difficulty sleeping because side sleeper. Reports not having too much difficulty with 14 stairs. Has been completing exercises. Follow-up with Dr. Edd Francis 8/10/18. Patient would like swelling to decrease.      Relevant Medical History:OA, back surgery, foot surgery, 2 shoulder surgeries  Functional Disability Index:PT G-Codes  Functional Assessment Tool Used: LEFS  Score: 93%  Functional Limitation: Mobility: Walking and moving around  Mobility: Walking and Moving Around Current Status (): At least 80 percent but less than 100 percent impaired, limited or restricted  Mobility: Walking and Moving Around Goal Status (): At least 20 percent but less than 40 percent impaired, limited or restricted    Pain Scale: 9/10  Easing factors: Percocet, ice, elevation  Provocative factors: bending     Type: [x]Constant   []Intermittent  []Radiating [x]Localized []other:     Numbness/Tingling: patient denies     Occupation/School: works at [employer], a lot of walking on concrete, getting into and out of cars    Living Status/Prior Level of Function: Independent with ADLs and IADLs    OBJECTIVE:     ROM LEFT RIGHT   HIP Flex     HIP Abd     HIP Ext     HIP IR     HIP ER     Knee ext 0    Knee Flex 62    Ankle PF     Ankle DF     Ankle In     Ankle Ev     Strength  LEFT RIGHT   HIP Flexors     HIP Abductors     HIP Ext     Hip ER     Knee EXT (quad) Fair-    Knee Flex (HS)     Ankle DF     Ankle PF     Ankle Inv     Ankle EV          Circumference  Mid apex  7 cm prox             Reflexes/Sensation:    []Dermatomes/Myotomes intact    []Reflexes equal and normal bilaterally   [x]Other: reflexes NT, sensation     Joint mobility:    []Normal    [x]Hypo   []Hyper    Palpation: TTP majority of anterior knee, tender to pressure posterior knee    Functional Mobility/Transfers: assistance required mod assistance to lift L knee onto mat; increased time required to complete.     Posture: Slight hunched putting pressure onto walker    Bandages/Dressings/Incisions: surgical incision covered with bandage and lateral incision covered with gauze and bandage; bloody discharge visible on lateral incision gauze, patient denied yellow/purulent discharge     Gait: (include in social activities. [x]Reduced participation in sport/recreation activities. Classification :    [x]Signs/symptoms consistent with post-surgical status including decreased ROM, strength and function. []Signs/symptoms consistent with joint sprain/strain   []Signs/symptoms consistent with patella-femoral syndrome   [x]Signs/symptoms consistent with knee OA/hip OA   []Signs/symptoms consistent with internal derangement of knee/Hip   []Signs/symptoms consistent with functional hip weakness/NMR control      []Signs/symptoms consistent with tendinitis/tendinosis    []signs/symptoms consistent with pathology which may benefit from Dry needling      []other:      Prognosis/Rehab Potential:      []Excellent   [x]Good    []Fair   []Poor    Tolerance of evaluation/treatment:    []Excellent   [x]Good    [x]Fair   []Poor  Physical Therapy Evaluation Complexity Justification  [x] A history of present problem with:  [x] no personal factors and/or comorbidities that impact the plan of care;  []1-2 personal factors and/or comorbidities that impact the plan of care  []3 personal factors and/or comorbidities that impact the plan of care  [x] An examination of body systems using standardized tests and measures addressing any of the following: body structures and functions (impairments), activity limitations, and/or participation restrictions;:  [x] a total of 1-2 or more elements   [] a total of 3 or more elements   [] a total of 4 or more elements   [x] A clinical presentation with:  [x] stable and/or uncomplicated characteristics   [] evolving clinical presentation with changing characteristics  [] unstable and unpredictable characteristics;   [x] Clinical decision making of [x] low, [] moderate, [] high complexity using standardized patient assessment instrument and/or measurable assessment of functional outcome.     [x] EVAL (LOW) 27974 (typically 20 minutes face-to-face)  [] EVAL (MOD) 57626 (typically 30 minutes skilled judgement, and was responsible for assessment and treatment of the patient.

## 2018-07-30 NOTE — FLOWSHEET NOTE
[facility], [address] [place] [address] [place]  Phone: [phone]  Fax [phone]    Physical Therapy Daily Treatment Note  Date:  2018    Patient Name:  Coby Vizcaino    :  1960  MRN: [id number]  Restrictions/Precautions:    Physician Information:  Referring Practitioner: Dr. Perez American  Medical/Treatment Diagnosis Information:  Diagnosis: Status post total left knee replacement (U51.115)  · Treatment Diagnosis:  Left knee stiffness (M25.662); left knee pain (M25.562)  [] Conservative / [x] Surgical - DOS: 18  Therapy Diagnosis/Practice Pattern:  Practice Pattern H: Joint Arthroplasty  Insurance/Certification information:  PT Insurance Information: [employer] [place]  Plan of care signed: [] YES  [] NO  Number of Comorbidities:  []0     [x]1-2    []3+   Date of Patient follow up with Physician: 8/10/18    G-Code (if applicable):      Date G-Code Applied:  18  PT G-Codes  Functional Assessment Tool Used: LEFS  Score: 93%  Functional Limitation: Mobility: Walking and moving around  Mobility: Walking and Moving Around Current Status (): At least 80 percent but less than 100 percent impaired, limited or restricted  Mobility: Walking and Moving Around Goal Status ():  At least 20 percent but less than 40 percent impaired, limited or restricted    Progress Note: [x]  Yes  []  No  Next due by: Visit #10        Latex Allergy:  [x]NO      []YES  Preferred Language for Healthcare:   [x]English       []other:    Visit # Insurance Allowable Reporting Period   1 Med mutual Begin Date: 2018               End Date:      RECERT DUE BY: 12 weeks    SUBJECTIVE:  See eval    OBJECTIVE: See eval  Observation:  Palpation:     Test used Initial score Current Score   Pain Summary VAS 8-9/10    Functional questionnaire LEFS 93%    ROM flexion 62     extension 0    Strength quad Poor +     ABD      flexion RESTRICTIONS/PRECAUTIONS: OA, bowel/bladder issues, 2 shoulder surgeries, B foot surgery, back surgery, gall bladder removal    Exercises/Interventions:     Therapeutic Ex Sets/reps Notes   Long sit hamstring stretch 4 x 20\" HEP   gastroc stretch with strap 4 x 20\" HEP   Quad set 2 x 10 x 10\" HEP   SB flexion 5\" x 10    Heel slide playground ball and strap X 10 About 50 deg knee flexion   EOB knee flexion hang 1' PT support of L leg                                                     Manual Intervention     Knee PROM, gastroc stretch 8'                             NMR re-education                                                      Therapeutic Exercise and NMR EXR  [x] (22376) Provided verbal/tactile cueing for activities related to strengthening, flexibility, endurance, ROM for improvements in LE, proximal hip, and core control with self care, mobility, lifting, ambulation.  [] (06031) Provided verbal/tactile cueing for activities related to improving balance, coordination, kinesthetic sense, posture, motor skill, proprioception  to assist with LE, proximal hip, and core control in self care, mobility, lifting, ambulation and eccentric single leg control.      NMR and Therapeutic Activities:    [] (53125 or 50899) Provided verbal/tactile cueing for activities related to improving balance, coordination, kinesthetic sense, posture, motor skill, proprioception and motor activation to allow for proper function of core, proximal hip and LE with self care and ADLs  [] (57911) Gait Re-education- Provided training and instruction to the patient for proper LE, core and proximal hip recruitment and positioning and eccentric body weight control with ambulation re-education including up and down stairs     Home Exercise Program:    [x] (28197) Reviewed/Progressed HEP activities related to strengthening, flexibility, endurance, ROM of core, proximal hip and LE for functional self-care, mobility, lifting and ambulation/stair

## 2018-07-31 ENCOUNTER — TELEPHONE (OUTPATIENT)
Dept: ORTHOPEDIC SURGERY | Age: 58
End: 2018-07-31

## 2018-08-01 ENCOUNTER — HOSPITAL ENCOUNTER (OUTPATIENT)
Dept: PHYSICAL THERAPY | Age: 58
Setting detail: THERAPIES SERIES
Discharge: HOME | End: 2018-08-01
Payer: COMMERCIAL

## 2018-08-01 PROCEDURE — 97140 MANUAL THERAPY 1/> REGIONS: CPT | Performed by: PHYSICAL THERAPIST

## 2018-08-01 PROCEDURE — 97110 THERAPEUTIC EXERCISES: CPT | Performed by: PHYSICAL THERAPIST

## 2018-08-01 PROCEDURE — 97016 VASOPNEUMATIC DEVICE THERAPY: CPT | Performed by: PHYSICAL THERAPIST

## 2018-08-01 NOTE — FLOWSHEET NOTE
core and proximal hip recruitment and positioning and eccentric body weight control with ambulation re-education including up and down stairs     Home Exercise Program:    [x] (17946) Reviewed/Progressed HEP activities related to strengthening, flexibility, endurance, ROM of core, proximal hip and LE for functional self-care, mobility, lifting and ambulation/stair navigation   [] (86604)Reviewed/Progressed HEP activities related to improving balance, coordination, kinesthetic sense, posture, motor skill, proprioception of core, proximal hip and LE for self care, mobility, lifting, and ambulation/stair navigation      Manual Treatments:  PROM / STM / Oscillations-Mobs:  G-I, II, III, IV (PA's, Inf., Post.)  [x] (85868) Provided manual therapy to mobilize LE, proximal hip and/or LS spine soft tissue/joints for the purpose of modulating pain, promoting relaxation,  increasing ROM, reducing/eliminating soft tissue swelling/inflammation/restriction, improving soft tissue extensibility and allowing for proper ROM for normal function with self care, mobility, lifting and ambulation. Modalities:  Vaso x 15'    Charges:  Timed Code Treatment Minutes: 45   Total Treatment Minutes: 60     [] EVAL (LOW) 93619 (typically 20 minutes face-to-face)  [] EVAL (MOD) 17741 (typically 30 minutes face-to-face)  [] EVAL (HIGH) 28498 (typically 45 minutes face-to-face)  [] RE-EVAL     [x] UP(58825) x  2   [] IONTO  [] NMR (11909) x      [x] VASO  [x] Manual (87688) x  1    [] Other:  [] TA x       [] Mech Traction (52816)  [] ES(attended) (11139)      [] ES (un) (19695):     GOALS:    Patient stated goal: decrease swelling; increase flexibility     Therapist goals for Patient:   Short Term Goals: To be achieved in: 2 weeks  1. Independent in HEP and progression per patient tolerance, in order to prevent re-injury. MET  2.  Patient will have a decrease in pain to facilitate improvement in movement, function, and ADLs as indicated by Functional Deficits.     Long Term Goals: To be achieved in: 12 weeks  1. Disability index score of 35% or less for the LEFS to assist with reaching prior level of function. 2. Patient will demonstrate increased flexion AROM to 120 to allow for proper joint functioning as indicated by patients Functional Deficits. 3. Patient will demonstrate an increase in Strength to good proximal hip strength and control 5/5 allow for proper functional mobility as indicated by patients Functional Deficits. 4. Patient will return to all functional activities without increased symptoms or restriction. 5. Patient will be able to return to work at [employer] without increased symptoms.           New or Updated Goals (if applicable):  [x] No change to goals established upon initial eval/last progress note:  New Goals:    Progression Towards Functional goals:   [] Patient is progressing as expected towards functional goals listed. [] Progression is slowed due to complexities listed. [] Progression has been slowed due to co-morbidities. [x] Plan just implemented, too soon to assess goals progression  [] Other:     ASSESSMENT:    [] Improvement noted relative to goals:  [] No Improvement noted related to goals:  Summary/Patient's response to treatment: Patient demonstrated increased muscle guarding with knee flexion MT. Tolerated initiation of additional exercises well, but limited at times by pain. Continue to focus on knee flexion ROM.      Treatment/Activity Tolerance:  [] Patient tolerated treatment well [] Patient limited by fatique  [x] Patient limited by pain  [] Patient limited by other medical complications  [] Other:     Prognosis: [x] Good [] Fair  [] Poor    Patient Requires Follow-up: [x] Yes  [] No    PLAN: [x] Continue per plan of care [] Alter current plan (see comments)  [] Plan of care initiated [] Hold pending MD visit [] Discharge    Electronically signed by: BIJU Blanca

## 2018-08-03 ENCOUNTER — HOSPITAL ENCOUNTER (OUTPATIENT)
Dept: PHYSICAL THERAPY | Age: 58
Setting detail: THERAPIES SERIES
Discharge: HOME | End: 2018-08-03
Payer: COMMERCIAL

## 2018-08-03 DIAGNOSIS — Z96.652 STATUS POST TOTAL LEFT KNEE REPLACEMENT: ICD-10-CM

## 2018-08-03 PROCEDURE — 97140 MANUAL THERAPY 1/> REGIONS: CPT

## 2018-08-03 PROCEDURE — 97016 VASOPNEUMATIC DEVICE THERAPY: CPT

## 2018-08-03 PROCEDURE — 97110 THERAPEUTIC EXERCISES: CPT

## 2018-08-03 RX ORDER — OXYCODONE HYDROCHLORIDE AND ACETAMINOPHEN 5; 325 MG/1; MG/1
1 TABLET ORAL EVERY 4 HOURS PRN
Qty: 40 TABLET | Refills: 0 | Status: SHIPPED | OUTPATIENT
Start: 2018-08-03 | End: 2018-08-10

## 2018-08-03 NOTE — FLOWSHEET NOTE
recruitment and positioning and eccentric body weight control with ambulation re-education including up and down stairs     Home Exercise Program:    [x] (56179) Reviewed/Progressed HEP activities related to strengthening, flexibility, endurance, ROM of core, proximal hip and LE for functional self-care, mobility, lifting and ambulation/stair navigation   [] (73052)Reviewed/Progressed HEP activities related to improving balance, coordination, kinesthetic sense, posture, motor skill, proprioception of core, proximal hip and LE for self care, mobility, lifting, and ambulation/stair navigation      Manual Treatments:  PROM / STM / Oscillations-Mobs:  G-I, II, III, IV (PA's, Inf., Post.)  [x] (86194) Provided manual therapy to mobilize LE, proximal hip and/or LS spine soft tissue/joints for the purpose of modulating pain, promoting relaxation,  increasing ROM, reducing/eliminating soft tissue swelling/inflammation/restriction, improving soft tissue extensibility and allowing for proper ROM for normal function with self care, mobility, lifting and ambulation. Modalities:  Vaso x 15'    Charges:  Timed Code Treatment Minutes: 45   Total Treatment Minutes: 60     [] EVAL (LOW) 49536 (typically 20 minutes face-to-face)  [] EVAL (MOD) 73086 (typically 30 minutes face-to-face)  [] EVAL (HIGH) 52486 (typically 45 minutes face-to-face)  [] RE-EVAL     [x] DK(98116) x  2   [] IONTO  [] NMR (68066) x      [x] VASO  [x] Manual (51136) x  1    [] Other:  [] TA x       [] Mech Traction (70949)  [] ES(attended) (64114)      [] ES (un) (74629):     GOALS:    Patient stated goal: decrease swelling; increase flexibility     Therapist goals for Patient:   Short Term Goals: To be achieved in: 2 weeks  1. Independent in HEP and progression per patient tolerance, in order to prevent re-injury. MET  2.  Patient will have a decrease in pain to facilitate improvement in movement, function, and ADLs as indicated by Functional current plan (see comments)  [] Plan of care initiated [] Hold pending MD visit [] Discharge    Electronically signed by: Angela Salinas PT

## 2018-08-06 ENCOUNTER — HOSPITAL ENCOUNTER (OUTPATIENT)
Dept: PHYSICAL THERAPY | Age: 58
Setting detail: THERAPIES SERIES
Discharge: HOME | End: 2018-08-06
Payer: COMMERCIAL

## 2018-08-06 PROCEDURE — 97110 THERAPEUTIC EXERCISES: CPT | Performed by: PHYSICAL THERAPIST

## 2018-08-06 PROCEDURE — 97016 VASOPNEUMATIC DEVICE THERAPY: CPT | Performed by: PHYSICAL THERAPIST

## 2018-08-06 PROCEDURE — 97140 MANUAL THERAPY 1/> REGIONS: CPT | Performed by: PHYSICAL THERAPIST

## 2018-08-06 NOTE — FLOWSHEET NOTE
[facility], [address] [place] [address] Lance  Phone: [phone]  Fax [phone]    Physical Therapy Daily Treatment Note  Date:  2018    Patient Name:  Xander David    :  1960  MRN: [id number]  Restrictions/Precautions:    Physician Information:  Referring Practitioner: Dr. Janet Forbes  Medical/Treatment Diagnosis Information:  Diagnosis: Status post total left knee replacement (S17.762)  · Treatment Diagnosis:  Left knee stiffness (M25.662); left knee pain (M25.562)  [] Conservative / [x] Surgical - DOS: 18  Therapy Diagnosis/Practice Pattern:  Practice Pattern H: Joint Arthroplasty  Insurance/Certification information:  PT Insurance Information: [employer].  Plan of care signed: [] YES  [] NO  Number of Comorbidities:  []0     [x]1-2    []3+   Date of Patient follow up with Physician: 8/10/18    G-Code (if applicable):      Date G-Code Applied:  18  PT G-Codes  Functional Assessment Tool Used: LEFS  Score: 93%  Functional Limitation: Mobility: Walking and moving around  Mobility: Walking and Moving Around Current Status (): At least 80 percent but less than 100 percent impaired, limited or restricted  Mobility: Walking and Moving Around Goal Status (): At least 20 percent but less than 40 percent impaired, limited or restricted    Progress Note: [x]  Yes  []  No  Next due by: Visit #10        Latex Allergy:  [x]NO      []YES  Preferred Language for Healthcare:   [x]English       []other:    Visit # Insurance Allowable Reporting Period   4 Med mutual Begin Date: 2018               End Date:      RECERT DUE BY: 12 weeks    SUBJECTIVE:  Working it hard at home patient reports. Thinks the mini squats and marches have helped with mobility. Able to now lift leg independently.      OBJECTIVE:  Observation:  Palpation:      Test used Initial score Current Score   Pain Summary VAS 8-9/10    Functional instruction to the patient for proper LE, core and proximal hip recruitment and positioning and eccentric body weight control with ambulation re-education including up and down stairs     Home Exercise Program:    [x] (39558) Reviewed/Progressed HEP activities related to strengthening, flexibility, endurance, ROM of core, proximal hip and LE for functional self-care, mobility, lifting and ambulation/stair navigation   [] (39325)Reviewed/Progressed HEP activities related to improving balance, coordination, kinesthetic sense, posture, motor skill, proprioception of core, proximal hip and LE for self care, mobility, lifting, and ambulation/stair navigation      Manual Treatments:  PROM / STM / Oscillations-Mobs:  G-I, II, III, IV (PA's, Inf., Post.)  [x] (49173) Provided manual therapy to mobilize LE, proximal hip and/or LS spine soft tissue/joints for the purpose of modulating pain, promoting relaxation,  increasing ROM, reducing/eliminating soft tissue swelling/inflammation/restriction, improving soft tissue extensibility and allowing for proper ROM for normal function with self care, mobility, lifting and ambulation. Modalities:  Vaso x 15'    Charges:  Timed Code Treatment Minutes: 45   Total Treatment Minutes: 60     [] EVAL (LOW) 93183 (typically 20 minutes face-to-face)  [] EVAL (MOD) 75567 (typically 30 minutes face-to-face)  [] EVAL (HIGH) 93073 (typically 45 minutes face-to-face)  [] RE-EVAL     [x] BU(18462) x  2   [] IONTO  [] NMR (94961) x      [x] VASO  [x] Manual (76072) x  1    [] Other:  [] TA x       [] Mech Traction (52882)  [] ES(attended) (22697)      [] ES (un) (98823):     GOALS:    Patient stated goal: decrease swelling; increase flexibility     Therapist goals for Patient:   Short Term Goals: To be achieved in: 2 weeks  1. Independent in HEP and progression per patient tolerance, in order to prevent re-injury. MET  2.  Patient will have a decrease in pain to facilitate improvement in movement, function, and ADLs as indicated by Functional Deficits.     Long Term Goals: To be achieved in: 12 weeks  1. Disability index score of 35% or less for the LEFS to assist with reaching prior level of function. 2. Patient will demonstrate increased flexion AROM to 120 to allow for proper joint functioning as indicated by patients Functional Deficits. 3. Patient will demonstrate an increase in Strength to good proximal hip strength and control 5/5 allow for proper functional mobility as indicated by patients Functional Deficits. 4. Patient will return to all functional activities without increased symptoms or restriction. 5. Patient will be able to return to work at [employer] without increased symptoms.           New or Updated Goals (if applicable):  [x] No change to goals established upon initial eval/last progress note:  New Goals:    Progression Towards Functional goals:   [] Patient is progressing as expected towards functional goals listed. [x] Progression is slowed due to complexities listed. [] Progression has been slowed due to co-morbidities. [x] Plan just implemented, too soon to assess goals progression  [x] Other: muscle guarding. ASSESSMENT:    [x] Improvement noted relative to goals: Quad control  [] No Improvement noted related to goals:  Summary/Patient's response to treatment: Patient making small gains in ROM flexion today, extension remains unchanged and at full range. Improved quad activation although patient fatigues quickly. Treatment/Activity Tolerance:  [] Patient tolerated treatment well [] Patient limited by fatique  [x] Patient limited by pain  [] Patient limited by other medical complications  [] Other:     Prognosis: [x] Good [] Fair  [] Poor    Patient Requires Follow-up: [x] Yes  [] No    PLAN: Begin SLR's at home 2 sets of 10 2x per day. May DC knee Immobilizer. Amb WBAT with standard walker. Next MD appt 8/10/18.  Would recommend wearing Gym shoes next PT visit.    [x] Continue per plan of care [] Alter current plan (see comments)  [] Plan of care initiated [] Hold pending MD visit [] Discharge    Electronically signed by: Marsha Fink, PT

## 2018-08-08 ENCOUNTER — HOSPITAL ENCOUNTER (OUTPATIENT)
Dept: PHYSICAL THERAPY | Age: 58
Setting detail: THERAPIES SERIES
Discharge: HOME | End: 2018-08-08
Payer: COMMERCIAL

## 2018-08-08 PROCEDURE — 97016 VASOPNEUMATIC DEVICE THERAPY: CPT | Performed by: PHYSICAL THERAPIST

## 2018-08-08 PROCEDURE — 97110 THERAPEUTIC EXERCISES: CPT | Performed by: PHYSICAL THERAPIST

## 2018-08-08 PROCEDURE — 97140 MANUAL THERAPY 1/> REGIONS: CPT | Performed by: PHYSICAL THERAPIST

## 2018-08-08 NOTE — FLOWSHEET NOTE
use of anti-inflammatory. OBJECTIVE:  Observation: Email sent to PA's of Dr. Janet Forbes in regards to patients pain and swelling with knee flexion progression. Palpation:      Test used Initial score Current Score   Pain Summary VAS 8-9/10 8/10   Functional questionnaire LEFS 93%    ROM flexion 62 81 deg SB heel slide / bike; PROM 90 deg    extension 0 0   Strength quad Poor +     ABD      flexion          RESTRICTIONS/PRECAUTIONS: OA, bowel/bladder issues, 2 shoulder surgeries, B foot surgery, back surgery, gall bladder removal    Exercises/Interventions:     Therapeutic Ex Sets/reps Notes   Long sit hamstring stretch 4 x 20\" HEP   gastroc stretch with strap 4 x 20\" HEP   Quad set 2 x 10 x 10\" HEP   SB flexion 5\" x 15    Heel slide playground ball and strap  About 50 deg knee flexion   EOB knee flexion hang With MHP x 6 min  PT support of L leg   SLR  2 X 10 Independent   SAQ 2 X 5    Standing marches UE support X 20 HEP   Mini squats UE support X 20 HEP   Heelslides with a strap 10 sec x 10 Add to HEP   LAQ 2 x 10              Bike rocking X 5 min          Manual Intervention     Pat mobs, Gentle oscillations Knee PROM Supine and sitting, gastroc stretch,  15'                             NMR re-education     VC's for heel strike and knee flexion during Gait                                               Therapeutic Exercise and NMR EXR  [x] (62889) Provided verbal/tactile cueing for activities related to strengthening, flexibility, endurance, ROM for improvements in LE, proximal hip, and core control with self care, mobility, lifting, ambulation.  [] (22129) Provided verbal/tactile cueing for activities related to improving balance, coordination, kinesthetic sense, posture, motor skill, proprioception  to assist with LE, proximal hip, and core control in self care, mobility, lifting, ambulation and eccentric single leg control.      NMR and Therapeutic Activities:    [] (51113 or 70353) Provided verbal/tactile cueing for activities related to improving balance, coordination, kinesthetic sense, posture, motor skill, proprioception and motor activation to allow for proper function of core, proximal hip and LE with self care and ADLs  [] (74029) Gait Re-education- Provided training and instruction to the patient for proper LE, core and proximal hip recruitment and positioning and eccentric body weight control with ambulation re-education including up and down stairs     Home Exercise Program:    [x] (34535) Reviewed/Progressed HEP activities related to strengthening, flexibility, endurance, ROM of core, proximal hip and LE for functional self-care, mobility, lifting and ambulation/stair navigation   [] (06926)Reviewed/Progressed HEP activities related to improving balance, coordination, kinesthetic sense, posture, motor skill, proprioception of core, proximal hip and LE for self care, mobility, lifting, and ambulation/stair navigation      Manual Treatments:  PROM / STM / Oscillations-Mobs:  G-I, II, III, IV (PA's, Inf., Post.)  [x] (12740) Provided manual therapy to mobilize LE, proximal hip and/or LS spine soft tissue/joints for the purpose of modulating pain, promoting relaxation,  increasing ROM, reducing/eliminating soft tissue swelling/inflammation/restriction, improving soft tissue extensibility and allowing for proper ROM for normal function with self care, mobility, lifting and ambulation.      Modalities:  Vaso x 15'    Charges:  Timed Code Treatment Minutes: 45   Total Treatment Minutes: 60     [] EVAL (LOW) 85046 (typically 20 minutes face-to-face)  [] EVAL (MOD) 96802 (typically 30 minutes face-to-face)  [] EVAL (HIGH) 06438 (typically 45 minutes face-to-face)  [] RE-EVAL     [x] NW(71718) x  2   [] IONTO  [] NMR (45809) x      [x] VASO  [x] Manual (13201) x  1    [] Other:  [] TA x       [] Mech Traction (89139)  [] ES(attended) (99460)      [] ES (un) (90335):     GOALS:    Patient stated goal: decrease swelling; increase flexibility     Therapist goals for Patient:   Short Term Goals: To be achieved in: 2 weeks  1. Independent in HEP and progression per patient tolerance, in order to prevent re-injury. MET  2. Patient will have a decrease in pain to facilitate improvement in movement, function, and ADLs as indicated by Functional Deficits.     Long Term Goals: To be achieved in: 12 weeks  1. Disability index score of 35% or less for the LEFS to assist with reaching prior level of function. 2. Patient will demonstrate increased flexion AROM to 120 to allow for proper joint functioning as indicated by patients Functional Deficits. 3. Patient will demonstrate an increase in Strength to good proximal hip strength and control 5/5 allow for proper functional mobility as indicated by patients Functional Deficits. 4. Patient will return to all functional activities without increased symptoms or restriction. 5. Patient will be able to return to work at [employer] without increased symptoms.           New or Updated Goals (if applicable):  [x] No change to goals established upon initial eval/last progress note:  New Goals:    Progression Towards Functional goals:   [] Patient is progressing as expected towards functional goals listed. [x] Progression is slowed due to complexities listed. [] Progression has been slowed due to co-morbidities. [x] Plan just implemented, too soon to assess goals progression  [x] Other: muscle guarding. ASSESSMENT:    [x] Improvement noted relative to goals: Quad control  [] No Improvement noted related to goals:  Summary/Patient's response to treatment: Patient making small gains in ROM flexion today, extension remains unchanged and at full range. Improved quad activation although patient fatigues quickly.      Treatment/Activity Tolerance:  [] Patient tolerated treatment well [] Patient limited by fatique  [x] Patient limited by pain  [] Patient limited by other medical

## 2018-08-09 ENCOUNTER — TELEPHONE (OUTPATIENT)
Dept: ORTHOPEDIC SURGERY | Age: 58
End: 2018-08-09

## 2018-08-09 DIAGNOSIS — Z96.659 STATUS POST TOTAL KNEE REPLACEMENT, UNSPECIFIED LATERALITY: Primary | ICD-10-CM

## 2018-08-09 RX ORDER — CYCLOBENZAPRINE HCL 10 MG
10 TABLET ORAL 3 TIMES DAILY PRN
Qty: 30 TABLET | Refills: 0 | Status: SHIPPED | OUTPATIENT
Start: 2018-08-09 | End: 2018-08-19

## 2018-08-09 RX ORDER — DICLOFENAC SODIUM 75 MG/1
75 TABLET, DELAYED RELEASE ORAL 2 TIMES DAILY
Qty: 60 TABLET | Refills: 1 | Status: SHIPPED | OUTPATIENT
Start: 2018-08-09 | End: 2018-08-30 | Stop reason: SDUPTHER

## 2018-08-10 ENCOUNTER — HOSPITAL ENCOUNTER (OUTPATIENT)
Dept: PHYSICAL THERAPY | Age: 58
Setting detail: THERAPIES SERIES
Discharge: HOME | End: 2018-08-10
Payer: COMMERCIAL

## 2018-08-10 ENCOUNTER — OFFICE VISIT (OUTPATIENT)
Dept: ORTHOPEDIC SURGERY | Age: 58
End: 2018-08-10

## 2018-08-10 VITALS — WEIGHT: 194 LBS | BODY MASS INDEX: 26.28 KG/M2 | HEIGHT: 72 IN

## 2018-08-10 DIAGNOSIS — M25.562 PAIN IN JOINT OF LEFT KNEE: Primary | ICD-10-CM

## 2018-08-10 DIAGNOSIS — Z96.652 STATUS POST TOTAL LEFT KNEE REPLACEMENT: ICD-10-CM

## 2018-08-10 PROBLEM — Z96.659 STATUS POST TOTAL KNEE REPLACEMENT: Status: ACTIVE | Noted: 2018-07-26

## 2018-08-10 PROCEDURE — 97140 MANUAL THERAPY 1/> REGIONS: CPT | Performed by: PHYSICAL THERAPIST

## 2018-08-10 PROCEDURE — 99024 POSTOP FOLLOW-UP VISIT: CPT | Performed by: PHYSICIAN ASSISTANT

## 2018-08-10 PROCEDURE — 97110 THERAPEUTIC EXERCISES: CPT | Performed by: PHYSICAL THERAPIST

## 2018-08-10 NOTE — FLOWSHEET NOTE
Patient will have a decrease in pain to facilitate improvement in movement, function, and ADLs as indicated by Functional Deficits.     Long Term Goals: To be achieved in: 12 weeks  1. Disability index score of 35% or less for the LEFS to assist with reaching prior level of function. 2. Patient will demonstrate increased flexion AROM to 120 to allow for proper joint functioning as indicated by patients Functional Deficits. 3. Patient will demonstrate an increase in Strength to good proximal hip strength and control 5/5 allow for proper functional mobility as indicated by patients Functional Deficits. 4. Patient will return to all functional activities without increased symptoms or restriction. 5. Patient will be able to return to work at [employer] without increased symptoms.           New or Updated Goals (if applicable):  [x] No change to goals established upon initial eval/last progress note:  New Goals:    Progression Towards Functional goals:   [] Patient is progressing as expected towards functional goals listed. [x] Progression is slowed due to complexities listed. [] Progression has been slowed due to co-morbidities. [x] Plan just implemented, too soon to assess goals progression  [x] Other: muscle guarding. ASSESSMENT:    [x] Improvement noted relative to goals: Quad control  [] No Improvement noted related to goals:  Summary/Patient's response to treatment: Patient making small gains in ROM flexion today, extension remains unchanged and at full range. Improved quad activation although patient fatigues quickly. Treatment/Activity Tolerance:  [] Patient tolerated treatment well [] Patient limited by fatique  [x] Patient limited by pain  [] Patient limited by other medical complications  [] Other:     Prognosis: [x] Good [] Fair  [] Poor    Patient Requires Follow-up: [x] Yes  [] No    PLAN: Begin SLR's at home 2 sets of 10 2x per day. May DC knee Immobilizer.  Amb WBAT with standard walker. Next MD appt 8/10/18. Would recommend wearing Gym shoes next PT visit.    [x] Continue per plan of care [] Alter current plan (see comments)  [] Plan of care initiated [] Hold pending MD visit [] Discharge    Electronically signed by: Brian Garza, PT

## 2018-08-13 ENCOUNTER — HOSPITAL ENCOUNTER (OUTPATIENT)
Dept: PHYSICAL THERAPY | Age: 58
Setting detail: THERAPIES SERIES
Discharge: HOME | End: 2018-08-13
Payer: COMMERCIAL

## 2018-08-13 DIAGNOSIS — Z96.659 STATUS POST TOTAL KNEE REPLACEMENT, UNSPECIFIED LATERALITY: Primary | ICD-10-CM

## 2018-08-13 PROCEDURE — 97016 VASOPNEUMATIC DEVICE THERAPY: CPT | Performed by: PHYSICAL THERAPIST

## 2018-08-13 PROCEDURE — 97140 MANUAL THERAPY 1/> REGIONS: CPT | Performed by: PHYSICAL THERAPIST

## 2018-08-13 PROCEDURE — 97110 THERAPEUTIC EXERCISES: CPT | Performed by: PHYSICAL THERAPIST

## 2018-08-13 RX ORDER — HYDROCODONE BITARTRATE AND ACETAMINOPHEN 5; 325 MG/1; MG/1
1 TABLET ORAL
Qty: 40 TABLET | Refills: 0 | Status: SHIPPED | OUTPATIENT
Start: 2018-08-13 | End: 2018-08-30 | Stop reason: SDUPTHER

## 2018-08-15 ENCOUNTER — HOSPITAL ENCOUNTER (OUTPATIENT)
Dept: PHYSICAL THERAPY | Age: 58
Setting detail: THERAPIES SERIES
Discharge: HOME | End: 2018-08-15
Payer: COMMERCIAL

## 2018-08-15 PROCEDURE — 97140 MANUAL THERAPY 1/> REGIONS: CPT | Performed by: PHYSICAL THERAPIST

## 2018-08-15 PROCEDURE — 97016 VASOPNEUMATIC DEVICE THERAPY: CPT | Performed by: PHYSICAL THERAPIST

## 2018-08-15 PROCEDURE — 97110 THERAPEUTIC EXERCISES: CPT | Performed by: PHYSICAL THERAPIST

## 2018-08-15 NOTE — FLOWSHEET NOTE
Prognosis: [x] Good [] Fair  [] Poor    Patient Requires Follow-up: [x] Yes  [] No    PLAN: d/c to cane.    [x] Continue per plan of care [] Alter current plan (see comments)  [] Plan of care initiated [] Hold pending MD visit [] Discharge    Electronically signed by: Cathy Steinberg PT

## 2018-08-16 ENCOUNTER — TELEPHONE (OUTPATIENT)
Dept: ORTHOPEDIC SURGERY | Age: 58
End: 2018-08-16

## 2018-08-17 ENCOUNTER — HOSPITAL ENCOUNTER (OUTPATIENT)
Dept: PHYSICAL THERAPY | Age: 58
Setting detail: THERAPIES SERIES
Discharge: HOME | End: 2018-08-17
Payer: COMMERCIAL

## 2018-08-17 PROCEDURE — 97016 VASOPNEUMATIC DEVICE THERAPY: CPT | Performed by: PHYSICAL THERAPIST

## 2018-08-17 PROCEDURE — 97140 MANUAL THERAPY 1/> REGIONS: CPT | Performed by: PHYSICAL THERAPIST

## 2018-08-17 PROCEDURE — 97110 THERAPEUTIC EXERCISES: CPT | Performed by: PHYSICAL THERAPIST

## 2018-08-17 NOTE — FLOWSHEET NOTE
LEFS 93%    ROM flexion 62 124 deg    extension 0 0   Strength quad Poor + Fair    ABD      flexion          RESTRICTIONS/PRECAUTIONS: OA, bowel/bladder issues, 2 shoulder surgeries, B foot surgery, back surgery, gall bladder removal    Exercises/Interventions:     Therapeutic Ex Sets/reps Notes   Long sit hamstring stretch 4 x 20\" HEP   HEP   Quad set 15 x 10\"  HEP   SB flexion 5\" x 15         EOB knee flexion hang With MHP x 6 min  PT support of L leg   SLR  2#  2 x 10 Independent   SAQ 2# 20 x 5        Standing marches UE support X 20 HEP   Mini squats UE support X 20 HEP   Heelslides with a strap 10 sec x 10 Add to HEP   LAQ 2# 2 x 10    Bridges 15 x 5\"    SL ABD 15 x     Prone TKE 15 x 5\"     Bike  X 8  Min  Able to make revolution - 104 deg   Incline stretch 4 x 20\"     HR/ TR 15 x     Std HS curl 15 x     FSU  FSU/ Over 6\" 15 x   4\" 10 x     Manual Intervention     Pat mobs, Gentle oscillations Knee PROM Supine and sitting, gastroc stretch,  15'    Prone quad stetch 4 x 20\"                         NMR re-education     Good proper mechanics with cane                                               Therapeutic Exercise and NMR EXR  [x] (45858) Provided verbal/tactile cueing for activities related to strengthening, flexibility, endurance, ROM for improvements in LE, proximal hip, and core control with self care, mobility, lifting, ambulation.  [] (83298) Provided verbal/tactile cueing for activities related to improving balance, coordination, kinesthetic sense, posture, motor skill, proprioception  to assist with LE, proximal hip, and core control in self care, mobility, lifting, ambulation and eccentric single leg control.      NMR and Therapeutic Activities:    [] (90086 or 63680) Provided verbal/tactile cueing for activities related to improving balance, coordination, kinesthetic sense, posture, motor skill, proprioception and motor activation to allow for proper function of core, proximal hip and LE with self care and ADLs  [] (56062) Gait Re-education- Provided training and instruction to the patient for proper LE, core and proximal hip recruitment and positioning and eccentric body weight control with ambulation re-education including up and down stairs     Home Exercise Program:    [x] (36366) Reviewed/Progressed HEP activities related to strengthening, flexibility, endurance, ROM of core, proximal hip and LE for functional self-care, mobility, lifting and ambulation/stair navigation   [] (54649)Reviewed/Progressed HEP activities related to improving balance, coordination, kinesthetic sense, posture, motor skill, proprioception of core, proximal hip and LE for self care, mobility, lifting, and ambulation/stair navigation      Manual Treatments:  PROM / STM / Oscillations-Mobs:  G-I, II, III, IV (PA's, Inf., Post.)  [x] (17869) Provided manual therapy to mobilize LE, proximal hip and/or LS spine soft tissue/joints for the purpose of modulating pain, promoting relaxation,  increasing ROM, reducing/eliminating soft tissue swelling/inflammation/restriction, improving soft tissue extensibility and allowing for proper ROM for normal function with self care, mobility, lifting and ambulation. Modalities  Vaso x 15'         Charges:  Timed Code Treatment Minutes: 45   Total Treatment Minutes: 60     [] EVAL (LOW) 64396 (typically 20 minutes face-to-face)  [] EVAL (MOD) 65962 (typically 30 minutes face-to-face)  [] EVAL (HIGH) 76276 (typically 45 minutes face-to-face)  [] RE-EVAL     [x] MJ(67775) x  2   [] IONTO  [] NMR (42398) x      [] VASO  [x] Manual (75818) x  1    [] Other:  [] TA x       [] Mech Traction (20582)  [] ES(attended) (47277)      [] ES (un) (22951):     GOALS:    Patient stated goal: decrease swelling; increase flexibility     Therapist goals for Patient:   Short Term Goals: To be achieved in: 2 weeks  1. Independent in HEP and progression per patient tolerance, in order to prevent re-injury. MET  2. Continue per plan of care [] Alter current plan (see comments)  [] Plan of care initiated [] Hold pending MD visit [] Discharge    Electronically signed by: Andreina Roland PT

## 2018-08-20 ENCOUNTER — APPOINTMENT (OUTPATIENT)
Dept: PHYSICAL THERAPY | Age: 58
End: 2018-08-20
Payer: COMMERCIAL

## 2018-08-21 ENCOUNTER — HOSPITAL ENCOUNTER (OUTPATIENT)
Dept: PHYSICAL THERAPY | Age: 58
Setting detail: THERAPIES SERIES
Discharge: HOME | End: 2018-08-21
Payer: COMMERCIAL

## 2018-08-21 PROCEDURE — G8978 MOBILITY CURRENT STATUS: HCPCS | Performed by: PHYSICAL THERAPIST

## 2018-08-21 PROCEDURE — 97016 VASOPNEUMATIC DEVICE THERAPY: CPT | Performed by: PHYSICAL THERAPIST

## 2018-08-21 PROCEDURE — 97140 MANUAL THERAPY 1/> REGIONS: CPT | Performed by: PHYSICAL THERAPIST

## 2018-08-21 PROCEDURE — 97110 THERAPEUTIC EXERCISES: CPT | Performed by: PHYSICAL THERAPIST

## 2018-08-21 PROCEDURE — G8979 MOBILITY GOAL STATUS: HCPCS | Performed by: PHYSICAL THERAPIST

## 2018-08-21 NOTE — FLOWSHEET NOTE
[facility], [address]  Phone: [phone]  Fax [phone]    Physical Therapy Daily Treatment Note  Date:  2018    Patient Name:  Jonny Hansen    :  1960  MRN: [id number]  Restrictions/Precautions:    Physician Information:  Referring Practitioner: Dr. Aldridge All  Medical/Treatment Diagnosis Information:  Diagnosis: Status post total left knee replacement (B01.973)  · Treatment Diagnosis:  Left knee stiffness (M25.662); left knee pain (M25.562)  [] Conservative / [x] Surgical - DOS: 18  Therapy Diagnosis/Practice Pattern:  Practice Pattern H: Joint Arthroplasty  Insurance/Certification information:  PT Insurance Information: [employer]  Plan of care signed: [] YES  [] NO  Number of Comorbidities:  []0     [x]1-2    []3+   Date of Patient follow up with Physician: 8/10/18    G-Code (if applicable):      Date G-Code Applied:  18  PT G-Codes  Functional Assessment Tool Used: LEFS  Score: 93%  Functional Limitation: Mobility: Walking and moving around  Mobility: Walking and Moving Around Current Status (): At least 80 percent but less than 100 percent impaired, limited or restricted  Mobility: Walking and Moving Around Goal Status (): At least 20 percent but less than 40 percent impaired, limited or restricted    Progress Note: [x]  Yes  []  No  Next due by: Visit #10        Latex Allergy:  [x]NO      []YES  Preferred Language for Healthcare:   [x]English       []other:    Visit # Insurance Allowable Reporting Period   10 20 Begin Date: 2018               End Date:      RECERT DUE BY: 12 weeks    SUBJECTIVE:  *Patient reports ascending stairs without difficulty, descending with reciprocal gait with use of cane and HR. Being more active around the house doing laundry and cleaning. Sleep improving. Pain slowly improving. Pain medication 1 x day as needed. Sore at end of the day. achieved in: 2 weeks  1. Independent in HEP and progression per patient tolerance, in order to prevent re-injury. MET  2. Patient will have a decrease in pain to facilitate improvement in movement, function, and ADLs as indicated by Functional Deficits.     Long Term Goals: To be achieved in: 12 weeks  1. Disability index score of 35% or less for the LEFS to assist with reaching prior level of function. 2. Patient will demonstrate increased flexion AROM to 120 to allow for proper joint functioning as indicated by patients Functional Deficits. 3. Patient will demonstrate an increase in Strength to good proximal hip strength and control 5/5 allow for proper functional mobility as indicated by patients Functional Deficits. 4. Patient will return to all functional activities without increased symptoms or restriction. 5. Patient will be able to return to work at [employer] without increased symptoms.           New or Updated Goals (if applicable):  [x] No change to goals established upon initial eval/last progress note:  New Goals:    Progression Towards Functional goals:   [] Patient is progressing as expected towards functional goals listed. [x] Progression is slowed due to complexities listed. [] Progression has been slowed due to co-morbidities. [x] Plan just implemented, too soon to assess goals progression  [x] Other: muscle guarding.     ASSESSMENT:    [x] Improvement noted relative to goals: Quad control  [] No Improvement noted related to goals:  Summary/Patient's response to treatment: see 10th visit note    Treatment/Activity Tolerance:  [x] Patient tolerated treatment well [] Patient limited by fatique  [] Patient limited by pain  [] Patient limited by other medical complications  [] Other:     Prognosis: [x] Good [] Fair  [] Poor    Patient Requires Follow-up: [x] Yes  [] No    PLAN: 2 x week   [x] Continue per plan of care [] Alter current plan (see comments)  [] Plan of care initiated [] Hold pending MD visit [] Discharge    Electronically signed by: Antonio Centeno PT

## 2018-08-21 NOTE — PLAN OF CARE
[facility], [address], [address] [place] [address]  Phone: [phone]  Fax [phone]     Physical Therapy Re-Certification Plan of Care    Dear  Dr. Kimberly Barrientos,    We had the pleasure of treating the following patient for physical therapy services at [address] [place] [address]. A summary of our findings can be found in the updated assessment below. This includes our plan of care. If you have any questions or concerns regarding these findings, please do not hesitate to contact me at the office phone number checked above. Thank you for the referral.     Physician Signature:________________________________Date:__________________  By signing above, therapists plan is approved by physician      Patient: Yogesh Ann   : 1960   MRN: [id number]  Referring Physician:  Dr. Kimberly Barrientos      Evaluation Date: 2018      Medical Diagnosis Information:  ·   Diagnosis: Status post total left knee replacement (H23.017)  · Treatment Diagnosis:  Left knee stiffness (M25.662); left knee pain (M25.562)  Insurance information:  [facility] [place] 20 visits    Date Range:18 to 18  Total visits:10      G-Codes: (if applicable) PT G-Codes  Functional Assessment Tool Used: LEFS  Score: 43%  Functional Limitation: Mobility: Walking and moving around  Mobility: Walking and Moving Around Current Status (): At least 40 percent but less than 60 percent impaired, limited or restricted  Mobility: Walking and Moving Around Goal Status (): At least 20 percent but less than 40 percent impaired, limited or restricted   Functional Index used:    SUBJECTIVE: *Patient reports ascending stairs without difficulty, descending with reciprocal gait with use of cane and HR. Being more active around the house doing laundry and cleaning. Sleep improving. Pain slowly improving. Pain medication 1 x day as needed. Sore at end of the day.   Only using cane outside the house and relying on it less.         Current Pain Scale: 5/10    Type: [x]Constant   []Intermitment  []Radiating []Localized  []other:     Functional Limitations: []Sitting [x]Standing [x]Walking    [x]Squatting [x]Stairs            []ADL's  []Driving []Sports/Recreation  [x]Other: Work      OBJECTIVE:   ·        Test used Initial score Current Score   Pain Summary VAS 8-9/10 5/10   Functional questionnaire LEFS 93% 43%   ROM flexion 62 124 deg     extension 0 0   Strength quad Poor + Fair+     ABD         flexion              Gait: Ambulates with SPC, minimal deviation    Joint mobility:    [x]Normal    []Hypo   []Hyper    Palpation:    Orthopedic Tests: NA    OTHER:      ASSESSMENT: Progressing well 3 weeks s/p TKR. Response to Treatment:   [x]Patient is responding well to treatment and improvement is noted with regards  to goals   []Patient should continue to improve in reasonable time if they continue HEP   []Patient has plateaued and is no longer responding to skilled PT intervention    []Patient is getting worse and would benefit from return to referring MD   []Patient unable to adhere to initial POC    Functional deficiencies/Impairements which affect ADL's and Reduce overall function:     [x]decreased core/proximal hip strength and neuromuscular control - Reduced  overall function   [x]decreased LE ROM/joint mobility- Reduced overall Function    [x]decreased LE strength- Reduced overall function with gait and steps   [x]difficulty with SLS- Reduced overall function and possible falls risk   [x]pain/difficulty with ambulation- reduced overall function and mobility   [x]unable to perform sport/recreational activity due to pain and dysfunction   []other:       Prognosis/Rehab Potential:    []Excellent   [x]Good    []Fair   []Poor:     Toleration of evaluation or treatment:    []Excellent   [x]Good    []Fair   []Poor     New or Updated Goals (if applicable):  [x] No change to goals established

## 2018-08-22 ENCOUNTER — APPOINTMENT (OUTPATIENT)
Dept: PHYSICAL THERAPY | Age: 58
End: 2018-08-22
Payer: COMMERCIAL

## 2018-08-24 ENCOUNTER — HOSPITAL ENCOUNTER (OUTPATIENT)
Dept: PHYSICAL THERAPY | Age: 58
Setting detail: THERAPIES SERIES
Discharge: HOME | End: 2018-08-24
Payer: COMMERCIAL

## 2018-08-24 PROCEDURE — 97140 MANUAL THERAPY 1/> REGIONS: CPT | Performed by: PHYSICAL THERAPIST

## 2018-08-24 PROCEDURE — 97110 THERAPEUTIC EXERCISES: CPT | Performed by: PHYSICAL THERAPIST

## 2018-08-24 PROCEDURE — 97016 VASOPNEUMATIC DEVICE THERAPY: CPT | Performed by: PHYSICAL THERAPIST

## 2018-08-24 NOTE — FLOWSHEET NOTE
Palpation:      Test used Initial score Current Score   Pain Summary VAS 8-9/10 5/10   Functional questionnaire LEFS 93% 43%   ROM flexion 62 124 deg    extension 0 0   Strength quad Poor + Fair+    ABD      flexion          RESTRICTIONS/PRECAUTIONS: OA, bowel/bladder issues, 2 shoulder surgeries, B foot surgery, back surgery, gall bladder removal    Exercises/Interventions:     See ATC. Therapeutic Ex Sets/reps Notes   Long sit hamstring stretch 4 x 20\" HEP   HEP   Quad set 10 x 10\"       SLR  2#  2 x 10 Independent   SAQ 2# 20 x 5        Standing marches UE support X 20 HEP   Mini squats UE support X 20 HEP   Heelslides with a strap 10 sec x 10 Add to HEP   LAQ 2# 2 x 10    Bridges 20 x 5\"    SL ABD 2# 20 x     Prone TKE 20 x 5\"     Bike  X 8  Min  Able to make revolution - 104 deg   Incline stretch 4 x 20\"     Leg Press 90# 20 x  atc   HR/ TR 15 x     Std HS curl 2# 20x     FSU  FSU/ Over  LSU/ over 6\" 15 x   6\" 10 x   6\" 10 x  atc   Manual Intervention     Pat mobs, Gentle oscillations Knee PROM Supine and sitting, gastroc stretch,  8'    Prone quad stetch 4 x 20\"                         NMR re-education     SLS 10 x 5\"  atc                                               Therapeutic Exercise and NMR EXR  [x] (96239) Provided verbal/tactile cueing for activities related to strengthening, flexibility, endurance, ROM for improvements in LE, proximal hip, and core control with self care, mobility, lifting, ambulation.  [] (36902) Provided verbal/tactile cueing for activities related to improving balance, coordination, kinesthetic sense, posture, motor skill, proprioception  to assist with LE, proximal hip, and core control in self care, mobility, lifting, ambulation and eccentric single leg control.      NMR and Therapeutic Activities:    [] (07098 or 50578) Provided verbal/tactile cueing for activities related to improving balance, coordination, kinesthetic sense, posture, motor skill, proprioception and motor and progression per patient tolerance, in order to prevent re-injury. MET  2. Patient will have a decrease in pain to facilitate improvement in movement, function, and ADLs as indicated by Functional Deficits.     Long Term Goals: To be achieved in: 12 weeks  1. Disability index score of 35% or less for the LEFS to assist with reaching prior level of function. 2. Patient will demonstrate increased flexion AROM to 120 to allow for proper joint functioning as indicated by patients Functional Deficits. 3. Patient will demonstrate an increase in Strength to good proximal hip strength and control 5/5 allow for proper functional mobility as indicated by patients Functional Deficits. 4. Patient will return to all functional activities without increased symptoms or restriction. 5. Patient will be able to return to work at [employer] without increased symptoms.           New or Updated Goals (if applicable):  [x] No change to goals established upon initial eval/last progress note:  New Goals:    Progression Towards Functional goals:   [x] Patient is progressing as expected towards functional goals listed. [] Progression is slowed due to complexities listed. [] Progression has been slowed due to co-morbidities. [] Plan just implemented, too soon to assess goals progression  [] Other: muscle guarding.     ASSESSMENT:    [x] Improvement noted relative to goals: Quad control  [] No Improvement noted related to goals:  Summary/Patient's response to treatment: see 10th visit note    Treatment/Activity Tolerance:  [x] Patient tolerated treatment well [] Patient limited by fatique  [] Patient limited by pain  [] Patient limited by other medical complications  [] Other:     Prognosis: [x] Good [] Fair  [] Poor    Patient Requires Follow-up: [x] Yes  [] No    PLAN: 2 x week   [x] Continue per plan of care [] Alter current plan (see comments)  [] Plan of care initiated [] Hold pending MD visit [] Discharge    Electronically signed by: Fredy Patrick, PT

## 2018-08-28 ENCOUNTER — HOSPITAL ENCOUNTER (OUTPATIENT)
Dept: PHYSICAL THERAPY | Age: 58
Setting detail: THERAPIES SERIES
Discharge: HOME | End: 2018-08-28
Payer: COMMERCIAL

## 2018-08-28 PROCEDURE — 97110 THERAPEUTIC EXERCISES: CPT | Performed by: PHYSICAL THERAPIST

## 2018-08-28 PROCEDURE — 97140 MANUAL THERAPY 1/> REGIONS: CPT | Performed by: PHYSICAL THERAPIST

## 2018-08-28 PROCEDURE — 97112 NEUROMUSCULAR REEDUCATION: CPT | Performed by: PHYSICAL THERAPIST

## 2018-08-28 PROCEDURE — 97016 VASOPNEUMATIC DEVICE THERAPY: CPT | Performed by: PHYSICAL THERAPIST

## 2018-08-28 NOTE — FLOWSHEET NOTE
[facility], [address] [place]  Phone: [phone]  Fax [phone]      ATHLETIC TRAINING [address]  Date:  2018    Patient Name:  Rhonda Chapman    :  1960  MRN: [id number]  Restrictions/Precautions:    Medical/Treatment Diagnosis Information:  ·  L TKR   DOS 18  ·  L knee stiffness, pain  Physician Information:   Dr. Kvng Henry Post-op  8 wks  12 wks 16 wks 20 wks   24 wks                            Activity Log                                                  DOS/DOI: 18                                                   Date: 18   ATC communication     Bike     Elliptical     Treadmill     Airdyne          Gastroc stretch     Soleus stretch     Hamstring stretch     ITB stretch     Hip Flexor stretch     Quad stretch     Adductor stretch          Weight Shifting sp                               fp                               tp     Lateral walking (with/w/o TB)          Balance: PEP/Brea board                    SLS 10x10\" Airex 10x10\"         Star excursion load/explode           Extremity reach UE/LE          Leg Press Loki. 90# 3x10 90# 3x10                     Ecc.                       Inv. Calf Press Loki. Ecc.                         Inv.          SAUL   Flex                ABd 45# R/L 2x10 45# R/L 2x10              ADd               TKE 45# 20x5\" 45# 20x5\"              Ext          Steps Up                Up and Over F/L 6\" 15x ea F/L 6\" 15x ea              Down                Lateral                Rotation          Squats  mini                   wall                  BOSU           Lunges:  Lunge to Balance                    Balance to Lunge                    Walking          Knee Extension Bilat. Ecc.                                Inv. Hamstring Curls Bilat.  40# 2x10 40#

## 2018-08-28 NOTE — FLOWSHEET NOTE
[facility], [address] [place] [address] [place]  Phone: [phone]  Fax [phone]    Physical Therapy Daily Treatment Note  Date:  2018    Patient Name:  Jonny Hansen    :  1960  MRN: [id number]  Restrictions/Precautions:    Physician Information:  Referring Practitioner: Dr. Aldridge All  Medical/Treatment Diagnosis Information:  Diagnosis: Status post total left knee replacement (Q21.516)  · Treatment Diagnosis:  Left knee stiffness (M25.662); left knee pain (M25.562)  [] Conservative / [x] Surgical - DOS: 18  Therapy Diagnosis/Practice Pattern:  Practice Pattern H: Joint Arthroplasty  Insurance/Certification information:  PT Insurance Information: [employer] [place] - MED [employer] - [id number]  Plan of care signed: [] YES  [] NO  Number of Comorbidities:  []0     [x]1-2    []3+   Date of Patient follow up with Physician: 8/10/18    G-Code (if applicable):      Date G-Code Applied:  18  PT G-Codes  Functional Assessment Tool Used: LEFS  Score: 93%  Functional Limitation: Mobility: Walking and moving around  Mobility: Walking and Moving Around Current Status (): At least 80 percent but less than 100 percent impaired, limited or restricted  Mobility: Walking and Moving Around Goal Status (): At least 20 percent but less than 40 percent impaired, limited or restricted    Progress Note: [x]  Yes  []  No  Next due by: Visit #10        Latex Allergy:  [x]NO      []YES  Preferred Language for Healthcare:   [x]English       []other:    Visit # Insurance Allowable Reporting Period    Begin Date: 2018               End Date:      RECERT DUE BY: 12 weeks    SUBJECTIVE:  Pt is sore today. He said he did an aggressive workout yesterday. Pt says he has been sleeping well. He cont to ice. Able to descend stairs with reciprocal gait.      OBJECTIVE:  Observation:   Palpation:      Test used Initial score Current Score   Pain Summary VAS 8-9/10 6 /10   Functional questionnaire LEFS 93% 43%   ROM flexion 62 132    extension 0 0   Strength quad Poor + good    SLR  indp        RESTRICTIONS/PRECAUTIONS: OA, bowel/bladder issues, 2 shoulder surgeries, B foot surgery, back surgery, gall bladder removal    Exercises/Interventions:     See ATC. Therapeutic Ex Sets/reps Notes   Long sit hamstring stretch 4 x 20\" HEP   HEP   Quad set 10 x 10\"       SLR  2#  3 x 10 Independent      HEP   Mini squats UE support X 20 HEP   Add to HEP   LAQ 2# 2 x 10    Bridges 20 x 5\"    SL ABD 2#    20 x     Prone TKE 20 x 5\"     Bike  X 8  Min  Able to make revolution - 104 deg   Incline stretch 4 x 20\"     atc   HR/ TR 15 x     Std HS curl 2# 20x     Manual Intervention     Pat mobs, Gentle oscillations Knee PROM Supine and sitting, gastroc stretch,  8'    Prone quad stetch 4 x 20\"                         NMR re-education     SLS 10 x 5\"  atc                                               Therapeutic Exercise and NMR EXR  [x] (98143) Provided verbal/tactile cueing for activities related to strengthening, flexibility, endurance, ROM for improvements in LE, proximal hip, and core control with self care, mobility, lifting, ambulation.  [] (13612) Provided verbal/tactile cueing for activities related to improving balance, coordination, kinesthetic sense, posture, motor skill, proprioception  to assist with LE, proximal hip, and core control in self care, mobility, lifting, ambulation and eccentric single leg control.      NMR and Therapeutic Activities:    [] (53726 or 32259) Provided verbal/tactile cueing for activities related to improving balance, coordination, kinesthetic sense, posture, motor skill, proprioception and motor activation to allow for proper function of core, proximal hip and LE with self care and ADLs  [] (55314) Gait Re-education- Provided training and instruction to the patient for proper LE, core and proximal hip Deficits.     Long Term Goals: To be achieved in: 12 weeks  1. Disability index score of 35% or less for the LEFS to assist with reaching prior level of function. 2. Patient will demonstrate increased flexion AROM to 120 to allow for proper joint functioning as indicated by patients Functional Deficits. 3. Patient will demonstrate an increase in Strength to good proximal hip strength and control 5/5 allow for proper functional mobility as indicated by patients Functional Deficits. 4. Patient will return to all functional activities without increased symptoms or restriction. 5. Patient will be able to return to work at [employer] without increased symptoms.           New or Updated Goals (if applicable):  [x] No change to goals established upon initial eval/last progress note:  New Goals:    Progression Towards Functional goals:   [x] Patient is progressing as expected towards functional goals listed. [] Progression is slowed due to complexities listed. [] Progression has been slowed due to co-morbidities. [] Plan just implemented, too soon to assess goals progression  [] Other: muscle guarding.     ASSESSMENT:    [x] Improvement noted relative to goals: Quad control  [] No Improvement noted related to goals:  Summary/Patient's response to treatment: see 10th visit note    Treatment/Activity Tolerance:  [x] Patient tolerated treatment well [] Patient limited by fatique  [] Patient limited by pain  [] Patient limited by other medical complications  [] Other:     Prognosis: [x] Good [] Fair  [] Poor    Patient Requires Follow-up: [x] Yes  [] No    PLAN: 2 x week   [x] Continue per plan of care [] Alter current plan (see comments)  [] Plan of care initiated [] Hold pending MD visit [] Discharge    Electronically signed by: Pan Cade PT

## 2018-08-30 ENCOUNTER — OFFICE VISIT (OUTPATIENT)
Dept: ORTHOPEDIC SURGERY | Age: 58
End: 2018-08-30

## 2018-08-30 VITALS — WEIGHT: 194 LBS | HEIGHT: 72 IN | BODY MASS INDEX: 26.28 KG/M2

## 2018-08-30 DIAGNOSIS — Z96.659 STATUS POST TOTAL KNEE REPLACEMENT, UNSPECIFIED LATERALITY: ICD-10-CM

## 2018-08-30 DIAGNOSIS — Z96.642 STATUS POST TOTAL HIP REPLACEMENT, LEFT: Primary | ICD-10-CM

## 2018-08-30 PROCEDURE — 99024 POSTOP FOLLOW-UP VISIT: CPT | Performed by: ORTHOPAEDIC SURGERY

## 2018-08-30 RX ORDER — CYCLOBENZAPRINE HCL 10 MG
10 TABLET ORAL 3 TIMES DAILY PRN
Qty: 30 TABLET | Refills: 1 | Status: SHIPPED | OUTPATIENT
Start: 2018-08-30 | End: 2018-09-09

## 2018-08-30 RX ORDER — HYDROCODONE BITARTRATE AND ACETAMINOPHEN 5; 325 MG/1; MG/1
1 TABLET ORAL
Qty: 28 TABLET | Refills: 0 | Status: SHIPPED | OUTPATIENT
Start: 2018-08-30 | End: 2018-09-06

## 2018-08-30 RX ORDER — DICLOFENAC SODIUM 75 MG/1
75 TABLET, DELAYED RELEASE ORAL 2 TIMES DAILY
Qty: 60 TABLET | Refills: 1 | Status: SHIPPED | OUTPATIENT
Start: 2018-08-30

## 2018-08-30 NOTE — LETTER
[facility]  Phone: [phone]  Fax: [phone]    Heriberto Lal MD        August 30, 2018     Patient: Sarita Pierre   YOB: 1960   Date of Visit: 8/30/2018       To Whom It May Concern: It is my medical opinion that Louise Spurr may return to work on 9/10/18 full duty with NO restrictions. If you have any questions or concerns, please don't hesitate to call.     Sincerely,        Heriberto Lal MD

## 2018-08-31 ENCOUNTER — HOSPITAL ENCOUNTER (OUTPATIENT)
Dept: PHYSICAL THERAPY | Age: 58
Setting detail: THERAPIES SERIES
Discharge: HOME | End: 2018-08-31
Payer: COMMERCIAL

## 2018-08-31 PROCEDURE — 97016 VASOPNEUMATIC DEVICE THERAPY: CPT | Performed by: PHYSICAL THERAPIST

## 2018-08-31 PROCEDURE — 97140 MANUAL THERAPY 1/> REGIONS: CPT | Performed by: PHYSICAL THERAPIST

## 2018-08-31 PROCEDURE — 97110 THERAPEUTIC EXERCISES: CPT | Performed by: PHYSICAL THERAPIST

## 2018-08-31 NOTE — FLOWSHEET NOTE
[facility],  [address] [place]  Phone: [phone]  Fax [phone]      ATHLETIC TRAINING [address]  Date:  2018    Patient Name:  Tanvir Pruett    :  1960  MRN: [id number]  Restrictions/Precautions:    Medical/Treatment Diagnosis Information:  ·  L TKR   DOS 18  ·  L knee stiffness, pain  Physician Information:   Dr. Martinez [facility] Post-op  8 wks  12 wks 16 wks 20 wks   24 wks                            Activity Log                                                  DOS/DOI: 18                                                   Date: 18   ATC communication:  Sales up/down, in/out car   Return to work 9/10  faitgued w/ RX - needs ecc quad & glut strength   Bike      Elliptical      Treadmill      Airdyne            Gastroc stretch      Soleus stretch      Hamstring stretch      ITB stretch      Hip Flexor stretch      Quad stretch      Adductor stretch            Weight Shifting sp                                fp                                tp      Lateral walking (with/w/o TB)            Balance: PEP/Brea board                     SLS 10x10\" Airex 10x10\" NV         Star excursion load/explode            Extremity reach UE/LE            Leg Press Loki. 90# 3x10 90# 3x10 100# 3x10                     Ecc.   60# 2x10                     Inv. Calf Press Loki.    NV                      Ecc.                          Inv.            SAUL   Flex                 ABd 45# R/L 2x10 45# R/L 2x10 45# R/L 3x10              ADd                TKE 45# 20x5\" 45# 20x5\" 60# 30x5\"              Ext   45# R/L 2x10         Steps Up                 Up and Over F/L 6\" 15x ea F/L 6\" 15x ea F/L 6\" 15x ea              Down                 Lateral                 Rotation            Squats  mini                    wall                   BOSU             Lunges:  Lunge to Balance Balance to Lunge                     Walking            Knee Extension Bilat. Ecc.                                 Inv. Hamstring Curls Bilat. 40# 2x10 40# (south) 3x10 40# (south) 3x10                              Ecc.                                 Inv.            Soleus Press Bilat. Ecc.                             Inv.                                   Ladders                  Square                 Jump/Hop  Low                        Med.                        High                                                                  Modality GR 15' GR 15' VPulse 15'   Initials                             JLW JLW DB   Time spent with PT assistant

## 2018-08-31 NOTE — FLOWSHEET NOTE
[facility], [address] [place] [address] [place]  Phone: [phone]  Fax [phone]    Physical Therapy Daily Treatment Note  Date:  2018    Patient Name:  Xander David    :  1960  MRN: [id number]  Restrictions/Precautions:    Physician Information:  Referring Practitioner: Dr. Janet Forbes  Medical/Treatment Diagnosis Information:  Diagnosis: Status post total left knee replacement (A28.706)  · Treatment Diagnosis:  Left knee stiffness (M25.662); left knee pain (M25.562)  [] Conservative / [x] Surgical - DOS: 18  Therapy Diagnosis/Practice Pattern:  Practice Pattern H: Joint Arthroplasty  Insurance/Certification information:  PT Insurance Information: [employer] [place] - MED [employer] - [id number]   Plan of care signed: [] YES  [] NO  Number of Comorbidities:  []0     [x]1-2    []3+   Date of Patient follow up with Physician: 8/10/18    G-Code (if applicable):      Date G-Code Applied:  18  PT G-Codes  Functional Assessment Tool Used: LEFS  Score: 93%  Functional Limitation: Mobility: Walking and moving around  Mobility: Walking and Moving Around Current Status (): At least 80 percent but less than 100 percent impaired, limited or restricted  Mobility: Walking and Moving Around Goal Status (): At least 20 percent but less than 40 percent impaired, limited or restricted    Progress Note: [x]  Yes  []  No  Next due by: Visit #10        Latex Allergy:  [x]NO      []YES  Preferred Language for Healthcare:   [x]English       []other:    Visit # Insurance Allowable Reporting Period    Begin Date: 2018               End Date:      RECERT DUE BY: 12 weeks    SUBJECTIVE:   No issues with strength training. Slight increase in swelling today. Stairs improving. Shakila Peyer motorcycle for short ride but did not feel strong enough.      OBJECTIVE:  Observation:   Palpation:      Test used Initial score Current Deficits.     Long Term Goals: To be achieved in: 12 weeks  1. Disability index score of 35% or less for the LEFS to assist with reaching prior level of function. 2. Patient will demonstrate increased flexion AROM to 120 to allow for proper joint functioning as indicated by patients Functional Deficits. 3. Patient will demonstrate an increase in Strength to good proximal hip strength and control 5/5 allow for proper functional mobility as indicated by patients Functional Deficits. 4. Patient will return to all functional activities without increased symptoms or restriction. 5. Patient will be able to return to work at [employer] without increased symptoms.           New or Updated Goals (if applicable):  [x] No change to goals established upon initial eval/last progress note:  New Goals:    Progression Towards Functional goals:   [x] Patient is progressing as expected towards functional goals listed. [] Progression is slowed due to complexities listed. [] Progression has been slowed due to co-morbidities. [] Plan just implemented, too soon to assess goals progression  [] Other: muscle guarding. ASSESSMENT:    [x] Improvement noted relative to goals: Quad control  [] No Improvement noted related to goals:  Summary/Patient's response to treatment: Progressing well with strengthening program.  Continue to work on quad strengthening as patient tolerates and eccentric control. Treatment/Activity Tolerance:  [x] Patient tolerated treatment well [] Patient limited by fatique  [] Patient limited by pain  [] Patient limited by other medical complications  [] Other:     Prognosis: [x] Good [] Fair  [] Poor    Patient Requires Follow-up: [x] Yes  [] No    PLAN: 2 x week next week decreasing to 1x week for 2-3 weeks following.     [x] Continue per plan of care [] Alter current plan (see comments)  [] Plan of care initiated [] Hold pending MD visit [] Discharge    Electronically signed by: Shamar Ramírez Krystin Sanderson, PT

## 2018-09-04 ENCOUNTER — HOSPITAL ENCOUNTER (OUTPATIENT)
Dept: PHYSICAL THERAPY | Age: 58
Setting detail: THERAPIES SERIES
Discharge: HOME | End: 2018-09-04
Payer: COMMERCIAL

## 2018-09-04 PROCEDURE — 97140 MANUAL THERAPY 1/> REGIONS: CPT | Performed by: PHYSICAL THERAPIST

## 2018-09-04 PROCEDURE — 97110 THERAPEUTIC EXERCISES: CPT | Performed by: PHYSICAL THERAPIST

## 2018-09-04 PROCEDURE — 97016 VASOPNEUMATIC DEVICE THERAPY: CPT | Performed by: PHYSICAL THERAPIST

## 2018-09-04 NOTE — FLOWSHEET NOTE
Squats  mini                     wall                    BOSU               Lunges:  Lunge to Balance                      Balance to Lunge                      Walking              Knee Extension Bilat. Ecc.                                  Inv. Hamstring Curls Bilat. 40# 2x10 40# (south) 3x10 40# (south) 3x10 50# (north) 3x10                              Ecc.                                  Inv.              Soleus Press Bilat. Ecc.                              Inv.                                      Ladders                   Square                  Jump/Hop  Low                         Med.                         High                                                                     Modality [id number]' GR 15' VPulse 15' VPulse 15'   Initials                             JLW JLW DB JLW   Time spent with PT assistant

## 2018-09-04 NOTE — FLOWSHEET NOTE
Functional questionnaire LEFS 93% 43%   ROM flexion 62 138    extension 0 0   Strength quad Poor + good    SLR  indp        RESTRICTIONS/PRECAUTIONS: OA, bowel/bladder issues, 2 shoulder surgeries, B foot surgery, back surgery, gall bladder removal    Exercises/Interventions:     See ATC. Therapeutic Ex Sets/reps Notes   Long sit hamstring stretch 4 x 20\" HEP      Quad set \"       SLR  2#  3 x 10 Independent           Mini squats UE support X 20 HEP      LAQ 4# 2 x 10    Bridges  With ADD 15 x 5\" / SB 20 x 5\" / HS curl 10 x     SL ABD 2#    30 x     Prone TKE 20 x 5\"     Supine hip flex with strap 3 x 20\"     Bike  X 8  Min     Incline stretch 4 x 20\"     atc   HR/ TR 20 x off step    SB wall sit 4 x 20\"     Std HS curl 4# 20x     Manual Intervention     Pat mobs, Gentle oscillations Knee PROM Supine and sitting, gastroc stretch,  6'    Prone quad stetch 4 x 20\"  Demonstrated with strap for HEP   STM to quad  X 5 min                    NMR re-education     SLS 5 x 20\"     Glider Posterior lunge 15 x                                            Therapeutic Exercise and NMR EXR  [x] (85038) Provided verbal/tactile cueing for activities related to strengthening, flexibility, endurance, ROM for improvements in LE, proximal hip, and core control with self care, mobility, lifting, ambulation.  [] (31473) Provided verbal/tactile cueing for activities related to improving balance, coordination, kinesthetic sense, posture, motor skill, proprioception  to assist with LE, proximal hip, and core control in self care, mobility, lifting, ambulation and eccentric single leg control.      NMR and Therapeutic Activities:    [] (17663 or 62567) Provided verbal/tactile cueing for activities related to improving balance, coordination, kinesthetic sense, posture, motor skill, proprioception and motor activation to allow for proper function of core, proximal hip and LE with self care and ADLs  [] (65029) Gait Re-education- Provided pending MD visit [] Discharge    Electronically signed by: Verona Gleason, PT

## 2018-09-07 ENCOUNTER — HOSPITAL ENCOUNTER (OUTPATIENT)
Dept: PHYSICAL THERAPY | Age: 58
Setting detail: THERAPIES SERIES
Discharge: HOME | End: 2018-09-07
Payer: COMMERCIAL

## 2018-09-07 PROCEDURE — 97016 VASOPNEUMATIC DEVICE THERAPY: CPT | Performed by: PHYSICAL THERAPIST

## 2018-09-07 PROCEDURE — 97110 THERAPEUTIC EXERCISES: CPT | Performed by: PHYSICAL THERAPIST

## 2018-09-07 PROCEDURE — 97140 MANUAL THERAPY 1/> REGIONS: CPT | Performed by: PHYSICAL THERAPIST

## 2018-09-07 NOTE — FLOWSHEET NOTE
[facility], [address] [place] [address]  Phone: [phone]  Fax [phone]    Physical Therapy Daily Treatment Note  Date:  2018    Patient Name:  Renard Foster    :  1960  MRN: [id number]  Restrictions/Precautions:    Physician Information:  Referring Practitioner: Dr. Margurette Canavan  Medical/Treatment Diagnosis Information:  Diagnosis: Status post total left knee replacement (G46.151)  · Treatment Diagnosis:  Left knee stiffness (M25.662); left knee pain (M25.562)  [] Conservative / [x] Surgical - DOS: 18  Therapy Diagnosis/Practice Pattern:  Practice Pattern H: Joint Arthroplasty  Insurance/Certification information:  PT Insurance Information: [employer] - [employer] - [id number]   Plan of care signed: [] YES  [] NO  Number of Comorbidities:  []0     [x]1-2    []3+   Date of Patient follow up with Physician: 8/10/18    G-Code (if applicable):      Date G-Code Applied:  18  PT G-Codes  Functional Assessment Tool Used: LEFS  Score: 93%  Functional Limitation: Mobility: Walking and moving around  Mobility: Walking and Moving Around Current Status (): At least 80 percent but less than 100 percent impaired, limited or restricted  Mobility: Walking and Moving Around Goal Status (): At least 20 percent but less than 40 percent impaired, limited or restricted    Progress Note: [x]  Yes  []  No  Next due by: Visit #10        Latex Allergy:  [x]NO      []YES  Preferred Language for Healthcare:   [x]English       []other:    Visit # Insurance Allowable Reporting Period   15 20 Begin Date: 2018               End Date:      RECERT DUE BY: 12 weeks    SUBJECTIVE:   Pt has some discomfort ascending stairs. Very seldom has episodes of giving way or buckling. Swelling persists; decreasing slowly. Pt takes anti inflammatories.       OBJECTIVE:  Observation:   Palpation:      Test used Initial (26025) Gait Re-education- Provided training and instruction to the patient for proper LE, core and proximal hip recruitment and positioning and eccentric body weight control with ambulation re-education including up and down stairs     Home Exercise Program:    [x] (96440) Reviewed/Progressed HEP activities related to strengthening, flexibility, endurance, ROM of core, proximal hip and LE for functional self-care, mobility, lifting and ambulation/stair navigation   [] (61181)Reviewed/Progressed HEP activities related to improving balance, coordination, kinesthetic sense, posture, motor skill, proprioception of core, proximal hip and LE for self care, mobility, lifting, and ambulation/stair navigation      Manual Treatments:  PROM / STM / Oscillations-Mobs:  G-I, II, III, IV (PA's, Inf., Post.)  [x] (61150) Provided manual therapy to mobilize LE, proximal hip and/or LS spine soft tissue/joints for the purpose of modulating pain, promoting relaxation,  increasing ROM, reducing/eliminating soft tissue swelling/inflammation/restriction, improving soft tissue extensibility and allowing for proper ROM for normal function with self care, mobility, lifting and ambulation. Modalities  Vaso x 15'         Charges:  Timed Code Treatment Minutes: 45   Total Treatment Minutes: 60     [] EVAL (LOW) 28372 (typically 20 minutes face-to-face)  [] EVAL (MOD) 12190 (typically 30 minutes face-to-face)  [] EVAL (HIGH) 72153 (typically 45 minutes face-to-face)  [] RE-EVAL     [x] TF(43346) x  2   [] IONTO  [] NMR (75409) x      [x] VASO  [x] Manual (45958) x  1    [] Other:  [] TA x       [] Mech Traction (27136)  [] ES(attended) (98399)      [] ES (un) (93510):     GOALS:    Patient stated goal: decrease swelling; increase flexibility     Therapist goals for Patient:   Short Term Goals: To be achieved in: 2 weeks  1. Independent in HEP and progression per patient tolerance, in order to prevent re-injury. MET  2.  Patient will have a Plan of care initiated [] Hold pending MD visit [] Discharge    Electronically signed by: Mayra Forrest PT

## 2018-09-07 NOTE — FLOWSHEET NOTE
[facility], [address] [place]  Phone: [phone]  Fax [phone]      ATHLETIC TRAINING [address]  Date:  2018    Patient Name:  Rhonda Chapman    :  1960  MRN: [id number]  Restrictions/Precautions:    Medical/Treatment Diagnosis Information:  ·  L TKR   DOS 18  ·  L knee stiffness, pain  Physician Information:   Dr. 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 90# 3x10 100# 3x10 100# 3x10 120# 2x10                     Ecc.  60# 2x10 60# 2x12 80# 2x10                     Inv. Calf Press Loki.   # 2x10 100# 3x10                      Ecc.                           Inv.              SAUL   Flex                  ABd 45# R/L 2x10 45# R/L 3x10 45# R/L 3x10 60# R/L 2x10              ADd                 TKE 45# 20x5\" 60# 30x5\" 60# 30x5\" 75# L 2x10              Ext  45# R/L 2x10 45# R/L 3x10 60# R/L 2x10          Steps Up                  Up and Over F/L 6\" 15x ea F/L 6\" 15x ea w/PT F/L 6\" 15ea              Down                  Lateral

## 2018-09-12 ENCOUNTER — HOSPITAL ENCOUNTER (OUTPATIENT)
Dept: PHYSICAL THERAPY | Age: 58
Setting detail: THERAPIES SERIES
Discharge: HOME | End: 2018-09-12
Payer: COMMERCIAL

## 2018-09-12 PROCEDURE — 97140 MANUAL THERAPY 1/> REGIONS: CPT | Performed by: PHYSICAL THERAPIST

## 2018-09-12 PROCEDURE — 97016 VASOPNEUMATIC DEVICE THERAPY: CPT | Performed by: PHYSICAL THERAPIST

## 2018-09-12 PROCEDURE — 97110 THERAPEUTIC EXERCISES: CPT | Performed by: PHYSICAL THERAPIST

## 2018-09-12 NOTE — FLOWSHEET NOTE
[facility], [address] [place]  Phone: [phone]  Fax [phone]      ATHLETIC TRAINING [address]   Date:  2018    Patient Name:  Ulysses Lawman    :  1960  MRN: [id number]  Restrictions/Precautions:    Medical/Treatment Diagnosis Information:  ·  L TKR   DOS 18  ·  L knee stiffness, pain  Physician Information:   Dr. 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  faitgued w/ RX - needs ecc quad & glut strength      Bike        Elliptical        Treadmill        Airdyne                Gastroc stretch        Soleus stretch        Hamstring stretch        ITB stretch        Hip Flexor stretch        Quad stretch        Adductor stretch                Weight Shifting sp                                  fp                                  tp        Lateral walking (with/w/o TB)                Balance: PEP/Brea board                       SLS Airex 10x10\" NV w/PT           Star excursion load/explode              Extremity reach UE/LE                Leg Press Loki. 90# 3x10 100# 3x10 100# 3x10 120# 2x10 120# 3x10                     Ecc.  60# 2x10 60# 2x12 80# 2x10 100# 3x10                     Inv. Calf Press Loki.   # 2x10 100# 3x10                       Ecc.                            Inv.                SAUL   Flex                   ABd 45# R/L 2x10 45# R/L 3x10 45# R/L 3x10 60# R/L 2x10 60# R/L 3x10              Add                  TKE 45# 20x5\" 60# 30x5\" 60# 30x5\" 75# L 2x10 75# L 3x10              Ext  45# R/L 2x10 45# R/L 3x10 60# R/L 2x10 60# R/L 3x10           Steps Up                   Up and Over F/L 6\" 15x ea F/L 6\" 15x ea w/PT F/L 6\" 15ea               Down                   Lateral                   Rotation                Squats  mini                      wall                     BOSU                Lunges:  Lunge to Balance                       Balance to Lunge                       Walking                Knee Extension Bilat. Ecc.                                   Inv. Hamstring Curls Bilat. 40# ([address]) 3x10 40# (south) 3x10 50# (north) 3x10 50# 3x12 60# 3x10                              Ecc.                                   Inv.                Soleus Press Bilat. Ecc.                               Inv.                                         Ladders                    Square                   Jump/Hop  Low                          Med.                          High                                                                        Modality [id number]' VPulse 15' VPulse 15' VPulse 15' CP 13'   Initials                             JLW DB JLW EP EP   Time spent with PT assistant

## 2018-09-19 ENCOUNTER — HOSPITAL ENCOUNTER (OUTPATIENT)
Dept: PHYSICAL THERAPY | Age: 58
Setting detail: THERAPIES SERIES
Discharge: HOME | End: 2018-09-19
Payer: COMMERCIAL

## 2018-09-19 PROCEDURE — 97140 MANUAL THERAPY 1/> REGIONS: CPT | Performed by: PHYSICAL THERAPIST

## 2018-09-19 PROCEDURE — 97110 THERAPEUTIC EXERCISES: CPT | Performed by: PHYSICAL THERAPIST

## 2018-09-19 NOTE — FLOWSHEET NOTE
[facility], [address] [place]  Phone: [phone]  Fax [phone]      ATHLETIC TRAINING [address]  Date:  2018    Patient Name:  Jacqueline Beth    :  1960  MRN: [id number]  Restrictions/Precautions:    Medical/Treatment Diagnosis Information:  ·  L TKR   DOS 18  ·  L knee stiffness, pain  Physician Information:   Dr. Irma Brito Post-op  8 wks  12 wks 16 wks 20 wks   24 wks                            Activity Log                                                  DOS/DOI: 18                                                   Date: 18   ATC communication:  Sales up/down, in/out car       Bike       Elliptical       Treadmill       Airdyne              Gastroc stretch       Soleus stretch       Hamstring stretch       ITB stretch       Hip Flexor stretch       Quad stretch       Adductor stretch              Weight Shifting sp                                 fp                                 tp       Lateral walking (with/w/o TB)              Balance: PEP/Brea board                      SLS w/PT            Star excursion load/explode             Extremity reach UE/LE              Leg Press Loki. 100# 3x10 120# 2x10 120# 3x10 120# 3x12                     Ecc. 60# 2x12 80# 2x10 100# 3x10 100# Staggered ft 3x12                     Inv. Calf Press Loki.  100# 2x10 100# 3x10                        Ecc.                           Inv.              SAUL   Flex                  ABd 45# R/L 3x10 60# R/L 2x10 60# R/L 3x10 60# R/L 3x12              Add                 TKE 60# 30x5\" 75# L 2x10 75# L 3x10 75# L 30x5'              Ext 45# R/L 3x10 60# R/L 2x10 60# R/L 3x10 60# R/L 3x10          Steps Up                  Up and Over w/PT F/L 6\" 15ea                Down                  Lateral                  Rotation              Squats  mini                     wall
Provided training and instruction to the patient for proper LE, core and proximal hip recruitment and positioning and eccentric body weight control with ambulation re-education including up and down stairs     Home Exercise Program:    [x] (36943) Reviewed/Progressed HEP activities related to strengthening, flexibility, endurance, ROM of core, proximal hip and LE for functional self-care, mobility, lifting and ambulation/stair navigation   [] (59439)Reviewed/Progressed HEP activities related to improving balance, coordination, kinesthetic sense, posture, motor skill, proprioception of core, proximal hip and LE for self care, mobility, lifting, and ambulation/stair navigation      Manual Treatments:  PROM / STM / Oscillations-Mobs:  G-I, II, III, IV (PA's, Inf., Post.)  [x] (48728) Provided manual therapy to mobilize LE, proximal hip and/or LS spine soft tissue/joints for the purpose of modulating pain, promoting relaxation,  increasing ROM, reducing/eliminating soft tissue swelling/inflammation/restriction, improving soft tissue extensibility and allowing for proper ROM for normal function with self care, mobility, lifting and ambulation. Modalities  CP x 15'         Charges:  Timed Code Treatment Minutes: 45   Total Treatment Minutes: 60     [] EVAL (LOW) 48576 (typically 20 minutes face-to-face)  [] EVAL (MOD) 99862 (typically 30 minutes face-to-face)  [] EVAL (HIGH) 14660 (typically 45 minutes face-to-face)  [] RE-EVAL     [x] MX(66609) x  2   [] IONTO  [] NMR (32127) x      [] VASO  [x] Manual (97539) x  1    [] Other:  [] TA x       [] Mech Traction (35518)  [] ES(attended) (03222)      [] ES (un) (72329):     GOALS:    Patient stated goal: decrease swelling; increase flexibility     Therapist goals for Patient:   Short Term Goals: To be achieved in: 2 weeks  1. Independent in HEP and progression per patient tolerance, in order to prevent re-injury. MET  2.  Patient will have a decrease in pain to facilitate

## 2018-09-26 ENCOUNTER — OFFICE VISIT (OUTPATIENT)
Dept: ORTHOPEDIC SURGERY | Age: 58
End: 2018-09-26

## 2018-09-26 VITALS — WEIGHT: 194 LBS | HEIGHT: 72 IN | BODY MASS INDEX: 26.28 KG/M2

## 2018-09-26 DIAGNOSIS — Z96.652 STATUS POST TOTAL LEFT KNEE REPLACEMENT: Primary | ICD-10-CM

## 2018-09-26 PROCEDURE — 99024 POSTOP FOLLOW-UP VISIT: CPT | Performed by: PHYSICIAN ASSISTANT

## 2018-09-26 NOTE — PROGRESS NOTES
Status post LEFT knee replacement: 7/26/2018    History of present illness: The patient returns today after LEFT total knee replacement performed on 7/26/2018. Pain control has been satisfactory with oral medications. There have been no fevers or chills. He has been back to work on his feet and also been working out at [employer]. He is doing very well in terms of range of motion. He is on his feet quite a bit and noticed some increased swelling with this. Pain Assessment  Location of Pain: Knee  Location Modifiers: Left  Severity of Pain: 6  Quality of Pain: Aching  Duration of Pain: Persistent  Frequency of Pain: Constant]    Physical examination: The incision is clean, dry, and intact with no drainage or signs of infection. Range of motion is 0 degrees of extension to135 degrees of flexion. There is expected swelling with no evidence of DVT and a negative Homans sign. Neurovascular exam is intact. Assessment/plan:   The patient is doing very well. He does have a job where he is on his feet for 10 or 12 hours at a time which certainly aggravates his swelling and discomfort. However, financially this is necessary for him. I've encouraged him to continue working on strengthening and endurance. We discussed antibiotic prophylaxis prior to dental visits. He will follow-up for her annual visit in 1 year.

## 2018-10-03 ENCOUNTER — HOSPITAL ENCOUNTER (OUTPATIENT)
Dept: PHYSICAL THERAPY | Age: 58
Setting detail: THERAPIES SERIES
Discharge: HOME | End: 2018-10-03
Payer: COMMERCIAL

## 2018-10-05 PROCEDURE — G8980 MOBILITY D/C STATUS: HCPCS | Performed by: PHYSICAL THERAPIST

## 2018-10-05 PROCEDURE — G8978 MOBILITY CURRENT STATUS: HCPCS | Performed by: PHYSICAL THERAPIST

## 2018-10-05 PROCEDURE — G8979 MOBILITY GOAL STATUS: HCPCS | Performed by: PHYSICAL THERAPIST

## 2018-10-21 ENCOUNTER — TELEPHONE (OUTPATIENT)
Dept: OTHER | Age: 58
End: 2018-10-21

## 2023-08-21 ENCOUNTER — HOSPITAL ENCOUNTER (OUTPATIENT)
Dept: VASCULAR LAB | Age: 63
Discharge: HOME | End: 2023-08-21
Payer: COMMERCIAL

## 2023-08-21 DIAGNOSIS — Z96.641 STATUS POST TOTAL REPLACEMENT OF RIGHT HIP: ICD-10-CM

## 2023-08-21 PROCEDURE — 93971 EXTREMITY STUDY: CPT

## (undated) DEVICE — PENCIL SMK EVAC ALL IN 1 DSGN ENH VISIBILITY IMPROVED AIR

## (undated) DEVICE — SUTURE VCRL + SZ 2-0 L18IN ABSRB UD CT1 L36MM 1/2 CIR VCP839D

## (undated) DEVICE — DRESSING FOAM W4XL10IN SIL RECT ADH WTRPRF FLM BK W/ BORD

## (undated) DEVICE — SUTURE MCRYL + SZ 4-0 L18IN ABSRB UD L19MM PS-2 3/8 CIR MCP496G

## (undated) DEVICE — DRILL BIT 3.2MM (1/8'') X 128.0MM

## (undated) DEVICE — STERILE LATEX POWDER-FREE SURGICAL GLOVESWITH NITRILE COATING: Brand: PROTEXIS

## (undated) DEVICE — STERILE POLYISOPRENE POWDER-FREE SURGICAL GLOVES: Brand: PROTEXIS

## (undated) DEVICE — Device

## (undated) DEVICE — SUCTION RESERVOIR 400CC LG VOL: Brand: CARDINAL HEALTH

## (undated) DEVICE — SMARTGOWN SURGICAL GOWN, LARGE: Brand: CONVERTORS

## (undated) DEVICE — Z CONVERTED USE 2271377 BANDAGE COMPR W6INXL5YD EC E REB

## (undated) DEVICE — GENESIS TROCHLEAR PIN 1/8 X 3: Brand: GENESIS

## (undated) DEVICE — SUTURE SURGLON SZ 1 L18IN NONABSORBABLE BLK GS 21 L37MM 1 2 8886196272

## (undated) DEVICE — SMARTGOWN SURGICAL GOWN, XL: Brand: CONVERTORS

## (undated) DEVICE — HANDPIECE SET WITH HIGH FLOW TIP AND SUCTION TUBE: Brand: INTERPULSE

## (undated) DEVICE — SYSTEM SKIN CLSR 22CM DERMBND PRINEO

## (undated) DEVICE — SMARTGOWN BREATHABLE SURGICAL GOWN: Brand: CONVERTORS

## (undated) DEVICE — HOOD, PEEL-AWAY: Brand: FLYTE

## (undated) DEVICE — BLADE SURG SAW SAG S STL AGG 905MM LEN 185MM W 127MM THCK

## (undated) DEVICE — 35 ML SYRINGE LUER-LOCK TIP: Brand: MONOJECT

## (undated) DEVICE — 3 BONE CEMENT MIXER: Brand: MIXEVAC

## (undated) DEVICE — SMARTGOWN SURGICAL GOWN, 2XL: Brand: CONVERTORS

## (undated) DEVICE — RIMMED SPEED PIN 45MM STERILE

## (undated) DEVICE — GLOVE SURG SZ 65 THK91MIL LTX FREE SYN POLYISOPRENE

## (undated) DEVICE — ZIMMER® STERILE DISPOSABLE TOURNIQUET CUFF WITH PLC, DUAL PORT, SINGLE BLADDER, 30 IN. (76 CM)

## (undated) DEVICE — 3M™ TEGADERM™ TRANSPARENT FILM DRESSING FRAME STYLE WITH BORDER, 1616, 4 IN X 4-3/4 IN (10 CM X 12 CM), 50/CT 4CT/CASE: Brand: 3M™ TEGADERM™

## (undated) DEVICE — STANDARD HYPODERMIC NEEDLE,POLYPROPYLENE HUB: Brand: MONOJECT

## (undated) DEVICE — GAUZE,SPONGE,2"X2",8PLY,STERILE,LF,2'S: Brand: MEDLINE

## (undated) DEVICE — DRAIN SURG 10FR L1/8IN DIA3.2MM SIL CHN RND HUBLESS FULL

## (undated) DEVICE — SOLUTION IRRIG 2000ML 0.9% SOD CHL USP UROMATIC PLAS CONT

## (undated) DEVICE — SYRINGE MED 10ML LUERLOCK TIP W/O SFTY DISP

## (undated) DEVICE — NEEDLE HYPO 20GA L1.5IN YEL POLYPR HUB S STL REG BVL STR